# Patient Record
Sex: MALE | Employment: UNEMPLOYED | URBAN - METROPOLITAN AREA
[De-identification: names, ages, dates, MRNs, and addresses within clinical notes are randomized per-mention and may not be internally consistent; named-entity substitution may affect disease eponyms.]

---

## 2018-03-16 ENCOUNTER — OFFICE VISIT (OUTPATIENT)
Dept: FAMILY MEDICINE | Facility: CLINIC | Age: 43
End: 2018-03-16
Payer: COMMERCIAL

## 2018-03-16 VITALS
OXYGEN SATURATION: 98 % | SYSTOLIC BLOOD PRESSURE: 121 MMHG | HEART RATE: 90 BPM | DIASTOLIC BLOOD PRESSURE: 86 MMHG | TEMPERATURE: 97.9 F | WEIGHT: 186 LBS

## 2018-03-16 DIAGNOSIS — K59.01 SLOW TRANSIT CONSTIPATION: ICD-10-CM

## 2018-03-16 DIAGNOSIS — K21.9 GASTROESOPHAGEAL REFLUX DISEASE, ESOPHAGITIS PRESENCE NOT SPECIFIED: ICD-10-CM

## 2018-03-16 DIAGNOSIS — R10.32 LLQ ABDOMINAL PAIN: Primary | ICD-10-CM

## 2018-03-16 PROCEDURE — 99203 OFFICE O/P NEW LOW 30 MIN: CPT | Performed by: FAMILY MEDICINE

## 2018-03-16 NOTE — PROGRESS NOTES
SUBJECTIVE:   Trevor Wallace is a 42 year old male who presents to clinic today for the following health issues:      ABDOMINAL PAIN     Onset: 2 weeks     Description:   Character: Stabbing  Location: left lower quadrant  Radiation: None    Intensity: severe    Progression of Symptoms:  intermittent    Accompanying Signs & Symptoms:  Fever/Chills?: no   Gas/Bloating: YES  Nausea: YES  Vomitting: no   Diarrhea?: no   Constipation:YES  Dysuria or Hematuria: no    History:   Trauma: no   Previous similar pain: YES   Previous tests done: none    Precipitating factors:   Does the pain change with:     Food: no      BM: YES    Urination: YES feels like he can't emply his bladder    Alleviating factors:      Therapies Tried and outcome: zantac    LMP:  not applicable     He is a new patient to our clinic.  He has received care at [facility] in the past.  He has a known history of GERD.  He is on Zantac for that.  In recent weeks he has been having some left lower quadrant abdominal discomfort.  He feels bloated at times.  Zantac does not seem to help it.  He does acknowledge having issues with constipation.  He is not using anything specifically for that now.  He has used an orange flavored powder in the past which has been helpful.  He is not sure the name of it.  He does have a history of a sensation of incomplete bladder emptying.  He was advised to see urology for that previously, but has not done so.  He is on no routine medications other than the ranitidine.    Problem list and histories reviewed & adjusted, as indicated.  Additional history: as documented    There is no problem list on file for this patient.    History reviewed. No pertinent surgical history.    Social History   Substance Use Topics     Smoking status: Current Some Day Smoker     Smokeless tobacco: Never Used     Alcohol use No     Family History   Problem Relation Age of Onset     Hypertension Mother          Current Outpatient Prescriptions  "  Medication Sig Dispense Refill     ranitidine (ZANTAC) 150 MG tablet Take by mouth 2 times daily       Not on File    Reviewed and updated as needed this visit by clinical staff  Tobacco  Allergies  Meds  Med Hx  Surg Hx  Fam Hx  Soc Hx      Reviewed and updated as needed this visit by Provider         ROS:  CONSTITUTIONAL: NEGATIVE for fever, chills, change in weight  ENT/MOUTH: NEGATIVE for ear, mouth and throat problems  RESP: NEGATIVE for significant cough or SOB  CV: NEGATIVE for chest pain, palpitations or peripheral edema    OBJECTIVE:     /86 (BP Location: Right arm, Patient Position: Sitting, Cuff Size: Adult Regular)  Pulse 90  Temp 97.9  F (36.6  C) (Oral)  Wt 186 lb (84.4 kg)  SpO2 98%  There is no height or weight on file to calculate BMI.  GENERAL: healthy, alert and no distress  ABDOMEN: Soft with very mild midepigastric and left lower quadrant abdominal discomfort.  He has some mild generalized \"fullness\" to the abdomen, but no distinct mass palpable that I can appreciate.  No guarding or rebound tenderness.    Diagnostic Test Results:  none     ASSESSMENT/PLAN:       ICD-10-CM    1. LLQ abdominal pain R10.32 CT Abdomen Pelvis w Contrast   2. Gastroesophageal reflux disease, esophagitis presence not specified K21.9    3. Slow transit constipation K59.01      I suspect his symptoms are primarily related to constipation, but he does have concerned about his abdominal fullness and bloating  I think a CT scan would be reasonable to help rule out underlying mass, diverticulitis, etc.  I advised him to use the orange flavored product in the meantime, which is likely Metamucil or Citrucel, to help him deal with the constipation  Continue with the ranitidine as well  I will have him return a day or 2 after the CT scan is done to review results and determine a further game plan for him    Jairo Clay MD  [facility]    "

## 2018-03-16 NOTE — MR AVS SNAPSHOT
After Visit Summary   3/16/2018    Trevor Wallace    MRN: [id number]           Patient Information     Date Of Birth          1975        Visit Information        Provider Department      3/16/2018 2:00 PM Jairo Clay MD [facility]        Today's Diagnoses     LLQ abdominal pain    -  1    Gastroesophageal reflux disease, esophagitis presence not specified        Slow transit constipation           Follow-ups after your visit        Your next 10 appointments already scheduled     Mar 19, 2018  2:00 PM CDT   (Arrive by 1:45 PM)   CT ABDOMEN PELVIS W CONTRAST with BECT1   [facility]    [address] [place] [id number] [phone]           Please bring any scans or X-rays taken at other hospitals, if similar tests were done. Also bring a list of your medicines, including vitamins, minerals and over-the-counter drugs. It is safest to leave personal items at home.  Be sure to tell your doctor:   If you have any allergies.   If there s any chance you are pregnant.   If you are breastfeeding.  You may have contrast for this exam. To prepare:   Do not eat or drink for 2 hours before your exam. If you need to take medicine, you may take it with small sips of water. (We may ask you to take liquid medicine as well.)   The day before your exam, drink extra fluids at least six 8-ounce glasses (unless your doctor tells you to restrict your fluids).   You will be given instructions on how to drink the contrast.  Patients over 70 or patients with diabetes or kidney problems:   If you haven t had a blood test (creatinine test) within the last 30 days, the Cardiologist/Radiologist may require you to get this test prior to your exam.  If you have diabetes:   Continue to take your metformin medication on the day of your exam  Please wear loose clothing, such as a sweat suit or jogging clothes. Avoid snaps, zippers and other metal.  "We may ask you to undress and put on a hospital gown.  If you have any questions, please call the Imaging Department where you will have your exam.            Mar 21, 2018  3:00 PM CDT   Office Visit with Jairo Clay MD   [facility]    [address] [address]   [phone]           Bring a current list of meds and any records pertaining to this visit. For Physicals, please bring immunization records and any forms needing to be filled out. Please arrive 10 minutes early to complete paperwork.              Future tests that were ordered for you today     Open Future Orders        Priority Expected Expires Ordered    CT Abdomen Pelvis w Contrast Routine  3/16/2019 3/16/2018            Who to contact     If you have questions or need follow up information about today's clinic visit or your schedule please contact [facility] directly at [phone].  Normal or non-critical lab and imaging results will be communicated to you by MyChart, letter or phone within 4 business days after the clinic has received the results. If you do not hear from us within 7 days, please contact the clinic through [email or web address] or phone. If you have a critical or abnormal lab result, we will notify you by phone as soon as possible.  Submit refill requests through [employer] or call your pharmacy and they will forward the refill request to us. Please allow 3 business days for your refill to be completed.          Additional Information About Your Visit        [employer] Information     [employer] lets you send messages to your doctor, view your test results, renew your prescriptions, schedule appointments and more. To sign up, go to [email or web address] . Click on \"Log in\" on the left side of the screen, which will take you to the Welcome page. Then click on \"Sign up Now\" on the right side of the page.     You will be asked to " enter the access code listed below, as well as some personal information. Please follow the directions to create your username and password.     Your access code is: [email or web address]  Expires: 2018  2:42 PM     Your access code will  in 90 days. If you need help or a new code, please call your [place] clinic or [phone].        Care EveryWhere ID     This is your Care EveryWhere ID. This could be used by other organizations to access your [place] medical records  [id number]        Your Vitals Were     Pulse Temperature Pulse Oximetry             90 97.9  F (36.6  C) (Oral) 98%          Blood Pressure from Last 3 Encounters:   18 121/86    Weight from Last 3 Encounters:   18 186 lb (84.4 kg)               Primary Care Provider Office Phone # Fax #    Jairo Clay [phone]       [address] [facility] [address]        Equal Access to Services     BASSAM RODRIGUEZ AH: Hadii aad ku hadasho Soomaali, waaxda luqadaha, qaybta kaalmada adeegyada, waxay idiin hayaan adeeg kharash la'mariellen . So [facility] [phone].    ATENCIÓN: Si habla español, tiene a french disposición servicios gratuitos de asistencia lingüística. Llame al [phone].    We comply with applicable federal civil rights laws and [place] laws. We do not discriminate on the basis of race, color, national origin, age, disability, sex, sexual orientation, or gender identity.            Thank you!     Thank you for choosing [facility] your care. Our goal is always to provide you with excellent care. Hearing back from our patients is one way we can continue to improve our services. Please take a few minutes to complete the written survey that you may receive in the mail after your visit with us. Thank you!             Your Updated Medication List - Protect others around you: Learn how to safely use, store and throw away your medicines at www.disposemymeds.org.          This list is  accurate as of 3/16/18  2:42 PM.  Always use your most recent med list.                   Brand Name Dispense Instructions for use Diagnosis    ZANTAC 150 MG tablet   Generic drug:  ranitidine      Take by mouth 2 times daily

## 2018-03-16 NOTE — NURSING NOTE
Chief Complaint   Patient presents with     Gastrophageal Reflux     Health Maintenance     lipid     Constipation       Initial /86 (BP Location: Right arm, Patient Position: Sitting, Cuff Size: Adult Regular)  Pulse 90  Temp 97.9  F (36.6  C) (Oral)  Wt 186 lb (84.4 kg)  SpO2 98% There is no height or weight on file to calculate BMI.  Medication Reconciliation: complete   Geena Jean Baptiste ma

## 2018-03-19 ENCOUNTER — RADIANT APPOINTMENT (OUTPATIENT)
Dept: CT IMAGING | Facility: CLINIC | Age: 43
End: 2018-03-19
Attending: FAMILY MEDICINE
Payer: COMMERCIAL

## 2018-03-19 DIAGNOSIS — R10.32 LLQ ABDOMINAL PAIN: ICD-10-CM

## 2018-03-19 PROCEDURE — 74177 CT ABD & PELVIS W/CONTRAST: CPT | Mod: TC

## 2018-03-19 RX ORDER — IOPAMIDOL 755 MG/ML
91 INJECTION, SOLUTION INTRAVASCULAR ONCE
Status: COMPLETED | OUTPATIENT
Start: 2018-03-19 | End: 2018-03-19

## 2018-03-19 RX ADMIN — IOPAMIDOL 91 ML: 755 INJECTION, SOLUTION INTRAVASCULAR at 14:16

## 2018-03-21 ENCOUNTER — OFFICE VISIT (OUTPATIENT)
Dept: FAMILY MEDICINE | Facility: CLINIC | Age: 43
End: 2018-03-21
Payer: COMMERCIAL

## 2018-03-21 VITALS
SYSTOLIC BLOOD PRESSURE: 122 MMHG | TEMPERATURE: 98 F | WEIGHT: 188 LBS | HEIGHT: 66 IN | DIASTOLIC BLOOD PRESSURE: 85 MMHG | HEART RATE: 87 BPM | BODY MASS INDEX: 30.22 KG/M2 | OXYGEN SATURATION: 99 %

## 2018-03-21 DIAGNOSIS — E78.5 HYPERLIPIDEMIA LDL GOAL <130: ICD-10-CM

## 2018-03-21 DIAGNOSIS — R53.83 FATIGUE, UNSPECIFIED TYPE: ICD-10-CM

## 2018-03-21 DIAGNOSIS — R33.9 INCOMPLETE BLADDER EMPTYING: ICD-10-CM

## 2018-03-21 DIAGNOSIS — E66.811 OBESITY (BMI 30.0-34.9): ICD-10-CM

## 2018-03-21 DIAGNOSIS — K21.9 GASTROESOPHAGEAL REFLUX DISEASE, ESOPHAGITIS PRESENCE NOT SPECIFIED: Primary | ICD-10-CM

## 2018-03-21 DIAGNOSIS — R73.01 IMPAIRED FASTING GLUCOSE: ICD-10-CM

## 2018-03-21 PROCEDURE — 99214 OFFICE O/P EST MOD 30 MIN: CPT | Performed by: FAMILY MEDICINE

## 2018-03-21 NOTE — NURSING NOTE
"Chief Complaint   Patient presents with     RECHECK     Imaging       Initial /85 (BP Location: Right arm, Patient Position: Chair, Cuff Size: Adult Regular)  Pulse 87  Temp 98  F (36.7  C) (Oral)  Ht 5' 5.55\" (1.665 m)  Wt 188 lb (85.3 kg)  SpO2 99%  BMI 30.76 kg/m2 Estimated body mass index is 30.76 kg/(m^2) as calculated from the following:    Height as of this encounter: 5' 5.55\" (1.665 m).    Weight as of this encounter: 188 lb (85.3 kg).  Medication Reconciliation: complete   Areli Moss CMA       "

## 2018-03-21 NOTE — MR AVS SNAPSHOT
After Visit Summary   3/21/2018    Trevor Wallace    MRN: [id number]           Patient Information     Date Of Birth          1975        Visit Information        Provider Department      3/21/2018 3:00 PM Jairo Clay MD [facility]        Today's Diagnoses     Gastroesophageal reflux disease, esophagitis presence not specified    -  1    Incomplete bladder emptying        Hyperlipidemia LDL goal <130        Impaired fasting glucose        Fatigue, unspecified type           Follow-ups after your visit        Follow-up notes from your care team     Return in about 1 month (around 4/21/2018).      Future tests that were ordered for you today     Open Future Orders        Priority Expected Expires Ordered    Basic metabolic panel Routine 4/18/2018 5/31/2018 3/21/2018    Hemoglobin A1c Routine 4/18/2018 5/31/2018 3/21/2018    Lipid panel reflex to direct LDL Fasting Routine 4/18/2018 5/31/2018 3/21/2018    CBC with platelets Routine 4/18/2018 5/31/2018 3/21/2018    Prostate spec antigen screen Routine 4/18/2018 5/31/2018 3/21/2018    TSH with free T4 reflex Routine 4/18/2018 5/31/2018 3/21/2018            Who to contact     If you have questions or need follow up information about today's clinic visit or your schedule please contact [facility] directly at [phone].  Normal or non-critical lab and imaging results will be communicated to you by MyChart, letter or phone within 4 business days after the clinic has received the results. If you do not hear from us within 7 days, please contact the clinic through MyChart or phone. If you have a critical or abnormal lab result, we will notify you by phone as soon as possible.  Submit refill requests through [employer] or call your pharmacy and they will forward the refill request to us. Please allow 3 business days for your refill to be completed.          Additional Information About Your Visit       "  MyChart Information     [employer] lets you send messages to your doctor, view your test results, renew your prescriptions, schedule appointments and more. To sign up, go to [email or web address] . Click on \"Log in\" on the left side of the screen, which will take you to the Welcome page. Then click on \"Sign up Now\" on the right side of the page.     You will be asked to enter the access code listed below, as well as some personal information. Please follow the directions to create your username and password.     Your access code is: [email or web address]  Expires: 2018  2:42 PM     Your access code will  in 90 days. If you need help or a new code, please call your [place] clinic or [phone].        Care EveryWhere ID     This is your Care EveryWhere ID. This could be used by other organizations to access your [place] medical records  [id number]        Your Vitals Were     Pulse Temperature Height Pulse Oximetry BMI (Body Mass Index)       87 98  F (36.7  C) (Oral) 5' 5.55\" (1.665 m) 99% 30.76 kg/m2        Blood Pressure from Last 3 Encounters:   18 122/85   18 121/86    Weight from Last 3 Encounters:   18 188 lb (85.3 kg)   18 186 lb (84.4 kg)                 Today's Medication Changes          These changes are accurate as of 3/21/18  3:39 PM.  If you have any questions, ask your nurse or doctor.               Start taking these medicines.        Dose/Directions    omeprazole 20 MG CR capsule   Commonly known as:  priLOSEC   Used for:  Gastroesophageal reflux disease, esophagitis presence not specified   Started by:  Jairo Clay MD        Dose:  20 mg   Take 1 capsule (20 mg) by mouth daily   Quantity:  30 capsule   Refills:  1            Where to get your medicines      These medications were sent to [facility] - [place], [place] - [address]  [address], [facility] [address]     Phone:  [phone]     omeprazole 20 MG " CR capsule                Primary Care Provider Office Phone # Fax #    Jairo Clay [phone]       [address] [facility] [address]        Equal Access to Services     BASSAM RODRIGUEZ : Hadii cherie ku timoo Sobrittonali, waaxda luqadaha, qaybta kaalmada ademikayada, blanco haywood laLamarbrandyn tovar. So [facility] [phone].    ATENCIÓN: Si habla español, tiene a french disposición servicios gratuitos de asistencia lingüística. Llame al [phone].    We comply with applicable federal civil rights laws and [place] laws. We do not discriminate on the basis of race, color, national origin, age, disability, sex, sexual orientation, or gender identity.            Thank you!     Thank you for choosing [facility]  for your care. Our goal is always to provide you with excellent care. Hearing back from our patients is one way we can continue to improve our services. Please take a few minutes to complete the written survey that you may receive in the mail after your visit with us. Thank you!             Your Updated Medication List - Protect others around you: Learn how to safely use, store and throw away your medicines at www.disposemymeds.org.          This list is accurate as of 3/21/18  3:39 PM.  Always use your most recent med list.                   Brand Name Dispense Instructions for use Diagnosis    omeprazole 20 MG CR capsule    priLOSEC    30 capsule    Take 1 capsule (20 mg) by mouth daily    Gastroesophageal reflux disease, esophagitis presence not specified       ZANTAC 150 MG tablet   Generic drug:  ranitidine      Take by mouth 2 times daily

## 2018-03-21 NOTE — PROGRESS NOTES
SUBJECTIVE:   Trevor Wallace is a 42 year old male who presents to clinic today for the following health issues:      Patient is here to go over CT results.    I had seen him last week with some abdominal discomfort.  He was having some symptoms of GERD and also constipation.  He would also get abdominal bloating at times.  He felt like his abdomen is been bigger in the last few weeks.  We did a CT scan a couple of days ago.  He is here to review those results.  He has been using some Metamucil in the last week and that does seem to be helping his constipation.  It is helping to relieve some of his abdominal symptoms.  He still complains of some ongoing reflux symptoms, however.  He generally uses Zantac twice a day for that.  He does acknowledge gaining some weight in recent years.  He had a physical last year which showed elevated glucose and cholesterol readings.    Problem list and histories reviewed & adjusted, as indicated.  Additional history: as documented    Patient Active Problem List   Diagnosis     Gastroesophageal reflux disease, esophagitis presence not specified     Hyperlipidemia LDL goal <130     Impaired fasting glucose     History reviewed. No pertinent surgical history.    Social History   Substance Use Topics     Smoking status: Current Some Day Smoker     Types: Cigarettes     Smokeless tobacco: Never Used     Alcohol use No     Family History   Problem Relation Age of Onset     Hypertension Mother          Current Outpatient Prescriptions   Medication Sig Dispense Refill            ranitidine (ZANTAC) 150 MG tablet Take by mouth 2 times daily       No Known Allergies    Reviewed and updated as needed this visit by clinical staff  Tobacco  Allergies  Meds  Med Hx  Surg Hx  Fam Hx  Soc Hx      Reviewed and updated as needed this visit by Provider         ROS:  He has had some generalized fatigue at times.  He will sometimes feel especially tired when he is at his desk at work.  He has an  "IT job.    He does also complain of a sense of incomplete bladder emptying as well.      OBJECTIVE:     /85 (BP Location: Right arm, Patient Position: Chair, Cuff Size: Adult Regular)  Pulse 87  Temp 98  F (36.7  C) (Oral)  Ht 5' 5.55\" (1.665 m)  Wt 188 lb (85.3 kg)  SpO2 99%  BMI 30.76 kg/m2  Body mass index is 30.76 kg/(m^2).  GENERAL: alert, no distress and over weight  ABDOMEN: soft, nontender, no hepatosplenomegaly, no masses     Diagnostic Test Results:  Results for orders placed or performed in visit on 03/19/18   CT Abdomen Pelvis w Contrast    Narrative    CT ABDOMEN AND PELVIS WITH CONTRAST  3/19/2018 2:16 PM     HISTORY: Left lower quadrant pain. Rule out diverticulitis,  constipation, etc.    TECHNIQUE: Axial images from the lung bases to the symphysis are  performed with additional coronal reformatted images. 91 mL of Isovue  370 are given intravenously.  Radiation dose for this scan was reduced  using automated exposure control, adjustment of the mA and/or kV  according to patient size, or iterative reconstruction technique. Oral  contrast is also given.    FINDINGS:  The lung bases are clear.    Abdomen: The liver, spleen, gallbladder, pancreas, adrenal glands and  kidneys are unremarkable. No hydronephrosis. No enlarged abdominal  lymph nodes. The bowel is unremarkable. No obstruction or  diverticulitis.    Pelvis: The bladder, prostate and rectum are unremarkable. No enlarged  pelvic lymph nodes or free fluid. Bone window examination is  unremarkable. No aggressive appearing bone lesions are evident.      Impression    IMPRESSION: No acute changes in the abdomen or pelvis to account for  patient's symptoms. No bowel obstruction, diverticulitis or  appendicitis. No evidence of constipation.    ALAI CHANG MD         ASSESSMENT/PLAN:       ICD-10-CM    1. Gastroesophageal reflux disease, esophagitis presence not specified K21.9 omeprazole (PRILOSEC) 20 MG CR capsule   2. Incomplete bladder " emptying R33.9 Prostate spec antigen screen   3. Hyperlipidemia LDL goal <130 E78.5 Lipid panel reflex to direct LDL Fasting   4. Impaired fasting glucose R73.01 Basic metabolic panel     Hemoglobin A1c   5. Fatigue, unspecified type R53.83 CBC with platelets     TSH with free T4 reflex   6. Obesity (BMI 30.0-34.9) E66.9      His abdominal CT scan is normal and reassuring  He is having some ongoing symptoms of GERD  I suggested using omeprazole 20 mg daily for a month in place of the ranitidine  I encouraged him on diet and exercise treatment as well which should help his GERD as well as his lipids and glucose  Continue using a fiber supplement as needed for constipation  We will have him return in about 4 weeks for some fasting labs as ordered above followed by a general physical exam and recheck on his GERD and abdominal symptoms  Discussed possible upper endoscopy for his GERD and/or urology referral for his bladder symptoms    Jairo Clay MD  [facility]

## 2018-04-16 DIAGNOSIS — R53.83 FATIGUE, UNSPECIFIED TYPE: ICD-10-CM

## 2018-04-16 DIAGNOSIS — R33.9 INCOMPLETE BLADDER EMPTYING: ICD-10-CM

## 2018-04-16 DIAGNOSIS — R73.01 IMPAIRED FASTING GLUCOSE: ICD-10-CM

## 2018-04-16 DIAGNOSIS — E78.5 HYPERLIPIDEMIA LDL GOAL <130: ICD-10-CM

## 2018-04-16 LAB
ANION GAP SERPL CALCULATED.3IONS-SCNC: 6 MMOL/L (ref 3–14)
BUN SERPL-MCNC: 7 MG/DL (ref 7–30)
CALCIUM SERPL-MCNC: 8.8 MG/DL (ref 8.5–10.1)
CHLORIDE SERPL-SCNC: 107 MMOL/L (ref 94–109)
CHOLEST SERPL-MCNC: 190 MG/DL
CO2 SERPL-SCNC: 27 MMOL/L (ref 20–32)
CREAT SERPL-MCNC: 0.74 MG/DL (ref 0.66–1.25)
ERYTHROCYTE [DISTWIDTH] IN BLOOD BY AUTOMATED COUNT: 12.9 % (ref 10–15)
GFR SERPL CREATININE-BSD FRML MDRD: >90 ML/MIN/1.7M2
GLUCOSE SERPL-MCNC: 111 MG/DL (ref 70–99)
HBA1C MFR BLD: 6.1 % (ref 0–5.6)
HCT VFR BLD AUTO: 40.5 % (ref 40–53)
HDLC SERPL-MCNC: 45 MG/DL
HGB BLD-MCNC: 13.7 G/DL (ref 13.3–17.7)
LDLC SERPL CALC-MCNC: 99 MG/DL
MCH RBC QN AUTO: 29.8 PG (ref 26.5–33)
MCHC RBC AUTO-ENTMCNC: 33.8 G/DL (ref 31.5–36.5)
MCV RBC AUTO: 88 FL (ref 78–100)
NONHDLC SERPL-MCNC: 145 MG/DL
PLATELET # BLD AUTO: 348 10E9/L (ref 150–450)
POTASSIUM SERPL-SCNC: 4.1 MMOL/L (ref 3.4–5.3)
PSA SERPL-ACNC: 0.76 UG/L (ref 0–4)
RBC # BLD AUTO: 4.59 10E12/L (ref 4.4–5.9)
SODIUM SERPL-SCNC: 140 MMOL/L (ref 133–144)
TRIGL SERPL-MCNC: 232 MG/DL
TSH SERPL DL<=0.005 MIU/L-ACNC: 1.64 MU/L (ref 0.4–4)
WBC # BLD AUTO: 12.7 10E9/L (ref 4–11)

## 2018-04-16 PROCEDURE — 85027 COMPLETE CBC AUTOMATED: CPT | Performed by: FAMILY MEDICINE

## 2018-04-16 PROCEDURE — 80048 BASIC METABOLIC PNL TOTAL CA: CPT | Performed by: FAMILY MEDICINE

## 2018-04-16 PROCEDURE — 84443 ASSAY THYROID STIM HORMONE: CPT | Performed by: FAMILY MEDICINE

## 2018-04-16 PROCEDURE — 83036 HEMOGLOBIN GLYCOSYLATED A1C: CPT | Performed by: FAMILY MEDICINE

## 2018-04-16 PROCEDURE — 36415 COLL VENOUS BLD VENIPUNCTURE: CPT | Performed by: FAMILY MEDICINE

## 2018-04-16 PROCEDURE — 80061 LIPID PANEL: CPT | Performed by: FAMILY MEDICINE

## 2018-04-16 PROCEDURE — G0103 PSA SCREENING: HCPCS | Performed by: FAMILY MEDICINE

## 2018-04-20 ENCOUNTER — OFFICE VISIT (OUTPATIENT)
Dept: FAMILY MEDICINE | Facility: CLINIC | Age: 43
End: 2018-04-20
Payer: COMMERCIAL

## 2018-04-20 VITALS
WEIGHT: 191 LBS | HEART RATE: 81 BPM | OXYGEN SATURATION: 95 % | HEIGHT: 66 IN | DIASTOLIC BLOOD PRESSURE: 83 MMHG | BODY MASS INDEX: 30.7 KG/M2 | SYSTOLIC BLOOD PRESSURE: 123 MMHG | TEMPERATURE: 97.6 F

## 2018-04-20 DIAGNOSIS — F17.200 SMOKING: ICD-10-CM

## 2018-04-20 DIAGNOSIS — K21.9 GASTROESOPHAGEAL REFLUX DISEASE, ESOPHAGITIS PRESENCE NOT SPECIFIED: ICD-10-CM

## 2018-04-20 DIAGNOSIS — R06.83 SNORING: ICD-10-CM

## 2018-04-20 DIAGNOSIS — E78.5 HYPERLIPIDEMIA LDL GOAL <130: ICD-10-CM

## 2018-04-20 DIAGNOSIS — Z00.00 ROUTINE GENERAL MEDICAL EXAMINATION AT A HEALTH CARE FACILITY: Primary | ICD-10-CM

## 2018-04-20 DIAGNOSIS — E66.811 OBESITY (BMI 30.0-34.9): ICD-10-CM

## 2018-04-20 DIAGNOSIS — R73.01 IMPAIRED FASTING GLUCOSE: ICD-10-CM

## 2018-04-20 PROCEDURE — 99396 PREV VISIT EST AGE 40-64: CPT | Performed by: FAMILY MEDICINE

## 2018-04-20 PROCEDURE — 99213 OFFICE O/P EST LOW 20 MIN: CPT | Mod: 25 | Performed by: FAMILY MEDICINE

## 2018-04-20 NOTE — NURSING NOTE
"Chief Complaint   Patient presents with     Physical     Imaging results       Initial /83 (BP Location: Right arm, Patient Position: Chair, Cuff Size: Adult Regular)  Pulse 81  Temp 97.6  F (36.4  C) (Oral)  Ht 5' 5.55\" (1.665 m)  Wt 191 lb (86.6 kg)  SpO2 95%  BMI 31.25 kg/m2 Estimated body mass index is 31.25 kg/(m^2) as calculated from the following:    Height as of this encounter: 5' 5.55\" (1.665 m).    Weight as of this encounter: 191 lb (86.6 kg).  Medication Reconciliation: complete   Areli Moss CMA       "

## 2018-04-20 NOTE — MR AVS SNAPSHOT
After Visit Summary   4/20/2018    Trevor Wallace    MRN: [id number]           Patient Information     Date Of Birth          1975        Visit Information        Provider Department      4/20/2018 3:00 PM Jairo Clay MD [facility]        Today's Diagnoses     Routine general medical examination at a health care facility    -  1    Gastroesophageal reflux disease, esophagitis presence not specified        Hyperlipidemia LDL goal <130        Obesity (BMI 30.0-34.9)        Impaired fasting glucose        Snoring          Care Instructions      Preventive Health Recommendations  Male Ages 40 to 49    Yearly exam:             See your health care provider every year in order to  o   Review health changes.   o   Discuss preventive care.    o   Review your medicines if your doctor has prescribed any.    You should be tested each year for STDs (sexually transmitted diseases) if you re at risk.     Have a cholesterol test every 5 years.     Have a colonoscopy (test for colon cancer) if someone in your family has had colon cancer or polyps before age 50.     After age 45, have a diabetes test (fasting glucose). If you are at risk for diabetes, you should have this test every 3 years.      Talk with your health care provider about whether or not a prostate cancer screening test (PSA) is right for you.    Shots: Get a flu shot each year. Get a tetanus shot every 10 years.     Nutrition:    Eat at least 5 servings of fruits and vegetables daily.     Eat whole-grain bread, whole-wheat pasta and brown rice instead of white grains and rice.     Talk to your provider about Calcium and Vitamin D.     Lifestyle    Exercise for at least 150 minutes a week (30 minutes a day, 5 days a week). This will help you control your weight and prevent disease.     Limit alcohol to one drink per day.     No smoking.     Wear sunscreen to prevent skin cancer.     See your dentist every six months for an  "exam and cleaning.              Follow-ups after your visit        Who to contact     If you have questions or need follow up information about today's clinic visit or your schedule please contact [facility] directly at [phone].  Normal or non-critical lab and imaging results will be communicated to you by MyChart, letter or phone within 4 business days after the clinic has received the results. If you do not hear from us within 7 days, please contact the clinic through MyChart or phone. If you have a critical or abnormal lab result, we will notify you by phone as soon as possible.  Submit refill requests through [employer] or call your pharmacy and they will forward the refill request to us. Please allow 3 business days for your refill to be completed.          Additional Information About Your Visit        [employer] Information     [employer] lets you send messages to your doctor, view your test results, renew your prescriptions, schedule appointments and more. To sign up, go to [email or web address] . Click on \"Log in\" on the left side of the screen, which will take you to the Welcome page. Then click on \"Sign up Now\" on the right side of the page.     You will be asked to enter the access code listed below, as well as some personal information. Please follow the directions to create your username and password.     Your access code is: [email or web address]  Expires: 2018  2:42 PM     Your access code will  in 90 days. If you need help or a new code, please call your [facility] or [phone].        Care EveryWhere ID     This is your Care EveryWhere ID. This could be used by other organizations to access your [place] medical records  [id number]        Your Vitals Were     Pulse Temperature Height Pulse Oximetry BMI (Body Mass Index)       81 97.6  F (36.4  C) (Oral) 5' 5.55\" (1.665 m) 95% 31.25 kg/m2        Blood Pressure from Last 3 Encounters:   18 123/83   18 " 122/85   03/16/18 121/86    Weight from Last 3 Encounters:   04/20/18 191 lb (86.6 kg)   03/21/18 188 lb (85.3 kg)   03/16/18 186 lb (84.4 kg)              Today, you had the following     No orders found for display         Where to get your medicines      These medications were sent to [facility] - [place], [place] - [address]  [address], [facility] [address]     Phone:  [phone]     omeprazole 20 MG CR capsule          Primary Care Provider Office Phone # Fax #    Jairo Clay [phone]       [address]  [facility] [address]        Equal Access to Services     BASSAM RODRIGUEZ : Hadii cherie greenwoodo Solilia, waaxda luqadaha, qaybta kaalmada adeegyada, blanco quinones . So [facility] [phone].    ATENCIÓN: Si habla español, tiene a french disposición servicios gratuitos de asistencia lingüística. [facility] [phone].    We comply with applicable federal civil rights laws and [place] laws. We do not discriminate on the basis of race, color, national origin, age, disability, sex, sexual orientation, or gender identity.            Thank you!     Thank you for choosing [facility]  for your care. Our goal is always to provide you with excellent care. Hearing back from our patients is one way we can continue to improve our services. Please take a few minutes to complete the written survey that you may receive in the mail after your visit with us. Thank you!             Your Updated Medication List - Protect others around you: Learn how to safely use, store and throw away your medicines at www.disposemymeds.org.          This list is accurate as of 4/20/18  3:40 PM.  Always use your most recent med list.                   Brand Name Dispense Instructions for use Diagnosis    omeprazole 20 MG CR capsule    priLOSEC    30 capsule    Take 1 capsule (20 mg) by mouth daily     Gastroesophageal reflux disease, esophagitis presence not specified

## 2018-04-20 NOTE — PROGRESS NOTES
SUBJECTIVE:   CC: Trevor Wallace is an 42 year old male who presents for a preventative health visit and follow-up on some baseline health conditions.     Physical   Annual:     Getting at least 3 servings of Calcium per day::  NO    Bi-annual eye exam::  NO    Dental care twice a year::  Yes    Sleep apnea or symptoms of sleep apnea::  Daytime drowsiness, Excessive snoring and Sleep apnea    Diet::  Regular (no restrictions), Low salt and Breakfast skipped    Frequency of exercise::  1 day/week    Duration of exercise::  N/A    Taking medications regularly::  Yes    Medication side effects::  None    Additional concerns today::  YES            Other concerns:     Go over imaging and lab results.  He has been using the omeprazole over the last month and that has completely resolved his symptoms.  He no longer has any GI discomfort or reflux symptoms.  See previous note for details.    Today's PHQ-2 Score:   PHQ-2 ( 1999 [employer]izer) 4/20/2018   Q1: Little interest or pleasure in doing things 0   Q2: Feeling down, depressed or hopeless 0   PHQ-2 Score 0   Q1: Little interest or pleasure in doing things Not at all   Q2: Feeling down, depressed or hopeless Not at all   PHQ-2 Score 0       Abuse: Current or Past(Physical, Sexual or Emotional)- No  Do you feel safe in your environment - Yes    Social History   Substance Use Topics     Smoking status: Current Some Day Smoker     Types: Cigarettes     Smokeless tobacco: Never Used     Alcohol use No     Alcohol Use 4/20/2018   If you drink alcohol do you typically have greater than 3 drinks per day OR greater than 7 drinks per week? Not Applicable       Last PSA:   PSA   Date Value Ref Range Status   04/16/2018 0.76 0 - 4 ug/L Final     Comment:     Assay Method:  Chemiluminescence using Siemens Vista analyzer       Reviewed orders with patient. Reviewed health maintenance and updated orders accordingly - Yes  Patient Active Problem List   Diagnosis     Gastroesophageal reflux  "disease, esophagitis presence not specified     Hyperlipidemia LDL goal <130     Impaired fasting glucose     Obesity (BMI 30.0-34.9)     Snoring     History reviewed. No pertinent surgical history.    Social History   Substance Use Topics     Smoking status: Current Some Day Smoker     Types: Cigarettes     Smokeless tobacco: Never Used     Alcohol use No     Family History   Problem Relation Age of Onset     Hypertension Mother          Current Outpatient Prescriptions   Medication Sig Dispense Refill     omeprazole (PRILOSEC) 20 MG CR capsule Take 1 capsule (20 mg) by mouth daily 30 capsule 5     No Known Allergies    Reviewed and updated as needed this visit by clinical staff  Tobacco  Allergies  Meds  Med Hx  Surg Hx  Fam Hx  Soc Hx        Reviewed and updated as needed this visit by Provider            Review of Systems  C: NEGATIVE for fever, chills, change in weight; he snores loudly when he sleeps, but does not seem to have sleep apnea number  I: NEGATIVE for worrisome rashes, moles or lesions  E: NEGATIVE for vision changes or irritation  ENT: NEGATIVE for ear, mouth and throat problems  R: NEGATIVE for significant cough or SOB  CV: NEGATIVE for chest pain, palpitations or peripheral edema  GI: See above   male: negative for dysuria, hematuria, decreased urinary stream, erectile dysfunction, urethral discharge  M: NEGATIVE for significant arthralgias or myalgia  N: NEGATIVE for weakness, dizziness or paresthesias  P: NEGATIVE for changes in mood or affect    OBJECTIVE:   /83 (BP Location: Right arm, Patient Position: Chair, Cuff Size: Adult Regular)  Pulse 81  Temp 97.6  F (36.4  C) (Oral)  Ht 5' 5.55\" (1.665 m)  Wt 191 lb (86.6 kg)  SpO2 95%  BMI 31.25 kg/m2    Physical Exam  GENERAL: alert, no distress and over weight  EYES: Eyes grossly normal to inspection, PERRL and conjunctivae and sclerae normal  HENT: ear canals and TM's normal, nose and mouth without ulcers or lesions  NECK: no " adenopathy, no asymmetry, masses, or scars and thyroid normal to palpation  RESP: lungs clear to auscultation - no rales, rhonchi or wheezes  CV: regular rate and rhythm, normal S1 S2, no S3 or S4, no murmur, click or rub, no peripheral edema and peripheral pulses strong  ABDOMEN: soft, nontender, no hepatosplenomegaly, no masses   MS: no gross musculoskeletal defects noted, no edema  SKIN: no suspicious lesions or rashes  NEURO: Normal strength and tone, mentation intact and speech normal  PSYCH: mentation appears normal, affect normal/bright    Orders Only on 04/16/2018   Component Date Value Ref Range Status     Sodium 04/16/2018 140  133 - 144 mmol/L Final     Potassium 04/16/2018 4.1  3.4 - 5.3 mmol/L Final     Chloride 04/16/2018 107  94 - 109 mmol/L Final     Carbon Dioxide 04/16/2018 27  20 - 32 mmol/L Final     Anion Gap 04/16/2018 6  3 - 14 mmol/L Final     Glucose 04/16/2018 111* 70 - 99 mg/dL Final    Fasting specimen     Urea Nitrogen 04/16/2018 7  7 - 30 mg/dL Final     Creatinine 04/16/2018 0.74  0.66 - 1.25 mg/dL Final     GFR Estimate 04/16/2018 >90  >60 mL/min/1.7m2 Final    Non  GFR Calc     GFR Estimate If Black 04/16/2018 >90  >60 mL/min/1.7m2 Final    African American GFR Calc     Calcium 04/16/2018 8.8  8.5 - 10.1 mg/dL Final     Hemoglobin A1C 04/16/2018 6.1* 0 - 5.6 % Final    Comment: Normal <5.7% Prediabetes 5.7-6.4%  Diabetes 6.5% or higher - adopted from ADA   consensus guidelines.       Cholesterol 04/16/2018 190  <200 mg/dL Final     Triglycerides 04/16/2018 232* <150 mg/dL Final    Comment: Borderline high:  150-199 mg/dl  High:             200-499 mg/dl  Very high:       >499 mg/dl  Fasting specimen       HDL Cholesterol 04/16/2018 45  >39 mg/dL Final     LDL Cholesterol Calculated 04/16/2018 99  <100 mg/dL Final    Desirable:       <100 mg/dl     Non HDL Cholesterol 04/16/2018 145* <130 mg/dL Final    Comment: Above Desirable:  130-159 mg/dl  Borderline high:   160-189 mg/dl  High:             190-219 mg/dl  Very high:       >219 mg/dl       WBC 04/16/2018 12.7* 4.0 - 11.0 10e9/L Final     RBC Count 04/16/2018 4.59  4.4 - 5.9 10e12/L Final     Hemoglobin 04/16/2018 13.7  13.3 - 17.7 g/dL Final     Hematocrit 04/16/2018 40.5  40.0 - 53.0 % Final     MCV 04/16/2018 88  78 - 100 fl Final     MCH 04/16/2018 29.8  26.5 - 33.0 pg Final     MCHC 04/16/2018 33.8  31.5 - 36.5 g/dL Final     RDW 04/16/2018 12.9  10.0 - 15.0 % Final     Platelet Count 04/16/2018 348  150 - 450 10e9/L Final     PSA 04/16/2018 0.76  0 - 4 ug/L Final    Assay Method:  Chemiluminescence using Siemens Vista analyzer     TSH 04/16/2018 1.64  0.40 - 4.00 mU/L Final     The CT scan was previously reviewed and was basically unremarkable    ASSESSMENT/PLAN:       ICD-10-CM    1. Routine general medical examination at a health care facility Z00.00    2. Gastroesophageal reflux disease, esophagitis presence not specified K21.9 omeprazole (PRILOSEC) 20 MG CR capsule   3. Hyperlipidemia LDL goal <130 E78.5    4. Obesity (BMI 30.0-34.9) E66.9    5. Impaired fasting glucose R73.01    6. Snoring R06.83      His blood pressure and other vital signs look fine  His labs show impaired fasting glucose and hypertriglyceridemia  I discussed the benefits of diet and exercise treatment for weight loss to help his glucose, lipids, snoring, reflux, etc.   He can continue with the omeprazole as needed, but I suggested trying to  wean off that medication as able  I gave him a handout on antireflux measures to follow  Plan a tentative recheck in 1 year, although he could recheck sooner as needed/desired    COUNSELING:   Reviewed preventive health counseling, as reflected in patient instructions       Regular exercise       Healthy diet/nutrition         reports that he has been smoking Cigarettes.  He has never used smokeless tobacco.    Estimated body mass index is 31.25 kg/(m^2) as calculated from the following:    Height as of  "this encounter: 5' 5.55\" (1.665 m).    Weight as of this encounter: 191 lb (86.6 kg).   Weight management plan: Discussed healthy diet and exercise guidelines and patient will follow up in 12 months in clinic to re-evaluate.    Counseling Resources:  ATP IV Guidelines  Pooled Cohorts Equation Calculator  FRAX Risk Assessment  ICSI Preventive Guidelines  Dietary Guidelines for Americans, 2010  USDA's MyPlate  ASA Prophylaxis  Lung CA Screening    Jairo Clay MD  [facility]      Answers for HPI/ROS submitted by the patient on 4/20/2018   PHQ-2 Score: 0    "

## 2018-07-24 ENCOUNTER — TELEPHONE (OUTPATIENT)
Dept: FAMILY MEDICINE | Facility: CLINIC | Age: 43
End: 2018-07-24

## 2018-07-24 DIAGNOSIS — K21.9 GASTROESOPHAGEAL REFLUX DISEASE, ESOPHAGITIS PRESENCE NOT SPECIFIED: Primary | ICD-10-CM

## 2018-07-24 NOTE — TELEPHONE ENCOUNTER
Prior Authorization Retail Medication Request    Medication/Dose: Omeprazole 20  ICD code (if different than what is on RX):    Previously Tried and Failed:    Rationale:  Max 120 days in 365 days    (have to try and fail on Ranitidine)    Insurance Name:  [employer] [employer]  Insurance ID:  [id number]      Pharmacy Information (if different than what is on RX)  Name:  Keesha Levy CPhT  [facility]  [phone]

## 2018-07-25 NOTE — TELEPHONE ENCOUNTER
PA Initiation    Medication: Omeprazole 20 mg  Insurance Company: [employer] - Phone [phone] Fax [phone]  Pharmacy Filling the Rx: [facility] [place], [place] - [address] [place] [address]  Filling Pharmacy Phone: [phone]  Filling Pharmacy Fax:    Start Date: 7/25/2018    THIS HAS BEEN SUBMITTED BY THE PRIOR-AUTHORIZATION TEAM. ANY QUESTIONS PLEASE CALL [phone]. THANK YOU

## 2018-07-26 NOTE — TELEPHONE ENCOUNTER
PRIOR AUTHORIZATION DENIED    Medication: Omeprazole 20 mg DENIED     Denial Date: 7/26/2018    Denial Rational: patient is allowed 1 capsule a day for maximum of 120 days within 365 days         Appeal Information: IF YOU WOULD LIKE TO APPEAL, PLEASE SUPPLY PA TEAM WITH A LETTER OF MEDICAL NECESSITY.

## 2018-07-26 NOTE — TELEPHONE ENCOUNTER
Patient stopped by the pharmacy and was told the Omeprazole was denied. He is requesting an alternative. Pharmacy said possibly Zantac.  Areli Moss CMA

## 2021-02-18 ENCOUNTER — OFFICE VISIT (OUTPATIENT)
Dept: FAMILY MEDICINE | Facility: CLINIC | Age: 46
End: 2021-02-18
Payer: COMMERCIAL

## 2021-02-18 VITALS
HEART RATE: 94 BPM | DIASTOLIC BLOOD PRESSURE: 84 MMHG | TEMPERATURE: 98.7 F | OXYGEN SATURATION: 100 % | WEIGHT: 207 LBS | SYSTOLIC BLOOD PRESSURE: 134 MMHG | BODY MASS INDEX: 33.87 KG/M2

## 2021-02-18 DIAGNOSIS — F17.200 SMOKING: ICD-10-CM

## 2021-02-18 DIAGNOSIS — R73.03 PREDIABETES: ICD-10-CM

## 2021-02-18 DIAGNOSIS — K21.9 GASTROESOPHAGEAL REFLUX DISEASE, UNSPECIFIED WHETHER ESOPHAGITIS PRESENT: Primary | ICD-10-CM

## 2021-02-18 DIAGNOSIS — M25.512 ACUTE PAIN OF LEFT SHOULDER: ICD-10-CM

## 2021-02-18 LAB
ANION GAP SERPL CALCULATED.3IONS-SCNC: 6 MMOL/L (ref 3–14)
BUN SERPL-MCNC: 12 MG/DL (ref 7–30)
CALCIUM SERPL-MCNC: 8.9 MG/DL (ref 8.5–10.1)
CHLORIDE SERPL-SCNC: 106 MMOL/L (ref 94–109)
CO2 SERPL-SCNC: 24 MMOL/L (ref 20–32)
CREAT SERPL-MCNC: 0.8 MG/DL (ref 0.66–1.25)
ERYTHROCYTE [DISTWIDTH] IN BLOOD BY AUTOMATED COUNT: 13.3 % (ref 10–15)
GFR SERPL CREATININE-BSD FRML MDRD: >90 ML/MIN/{1.73_M2}
GLUCOSE SERPL-MCNC: 143 MG/DL (ref 70–99)
HBA1C MFR BLD: 6.7 % (ref 0–5.6)
HCT VFR BLD AUTO: 39.9 % (ref 40–53)
HGB BLD-MCNC: 13.3 G/DL (ref 13.3–17.7)
MCH RBC QN AUTO: 28.4 PG (ref 26.5–33)
MCHC RBC AUTO-ENTMCNC: 33.3 G/DL (ref 31.5–36.5)
MCV RBC AUTO: 85 FL (ref 78–100)
PLATELET # BLD AUTO: 289 10E9/L (ref 150–450)
POTASSIUM SERPL-SCNC: 4.1 MMOL/L (ref 3.4–5.3)
RBC # BLD AUTO: 4.69 10E12/L (ref 4.4–5.9)
SODIUM SERPL-SCNC: 136 MMOL/L (ref 133–144)
WBC # BLD AUTO: 11.9 10E9/L (ref 4–11)

## 2021-02-18 PROCEDURE — 36415 COLL VENOUS BLD VENIPUNCTURE: CPT | Performed by: PHYSICIAN ASSISTANT

## 2021-02-18 PROCEDURE — 80048 BASIC METABOLIC PNL TOTAL CA: CPT | Performed by: PHYSICIAN ASSISTANT

## 2021-02-18 PROCEDURE — 99214 OFFICE O/P EST MOD 30 MIN: CPT | Performed by: PHYSICIAN ASSISTANT

## 2021-02-18 PROCEDURE — 83036 HEMOGLOBIN GLYCOSYLATED A1C: CPT | Performed by: PHYSICIAN ASSISTANT

## 2021-02-18 PROCEDURE — 85027 COMPLETE CBC AUTOMATED: CPT | Performed by: PHYSICIAN ASSISTANT

## 2021-02-18 ASSESSMENT — PAIN SCALES - GENERAL: PAINLEVEL: MILD PAIN (3)

## 2021-02-18 NOTE — PROGRESS NOTES
Assessment & Plan     Gastroesophageal reflux disease, unspecified whether esophagitis present  Chronic, recurrent GERD. Symptoms waking him at night at times. Not responding to nexium. Recommend getting upper endoscopy for further evaluation.   - GASTROENTEROLOGY ADULT REF PROCEDURE ONLY; Future    Acute pain of left shoulder  Acute pain - pain with certain arm movements. Suggest PT and ibuprofen.   - [facility] PT, HAND, AND CHIROPRACTIC REFERRAL; Future    Smoking  Wanting to quit. Has looked into chantix and is interested in trying it. Recommend follow up in 4 weeks to see how he is doing on the medicaiton.   - varenicline (CHANTIX MUNIRA) 0.5 MG X 11 & 1 MG X 42 tablet; Take 0.5 mg tab daily for 3 days, THEN 0.5 mg tab twice daily for 4 days, THEN 1 mg twice daily.    Prediabetes  Has had elevated blood sugars in the past. Recommend repeat blood work.   - Hemoglobin A1c  - Basic metabolic panel  (Ca, Cl, CO2, Creat, Gluc, K, Na, BUN)  - CBC with platelets    Return in about 4 weeks (around 3/18/2021).    Letty White PA-C  [facility] is a 45 year old who presents for the following health issues     HPI       Musculoskeletal problem/pain  Onset/Duration: 2 weeks  Description  Location: shoulder - left, chest  Joint Swelling: no  Redness: no  Pain: YES  Warmth: no  Intensity:  3/10  Progression of Symptoms:  Varies  Accompanying signs and symptoms:   Fevers: no  Numbness/tingling/weakness: YES- Weakness  History  Trauma to the area: no  Recent illness:  no  Previous similar problem: no  Previous evaluation:  no  Precipitating or alleviating factors:  Aggravating factors include: Certain movements  Therapies tried and outcome: nothing and Ibuprofen    No pain with lifting, no pain with reaching. Has pain into the back of his arm with driving.     Acid reflux symptoms are worsening.     Wants to quit smoking. 10-15 cigarettes.         Review of Systems   Constitutional,  HEENT, cardiovascular, pulmonary, gi and gu systems are negative, except as otherwise noted.      Objective    /84 (BP Location: Right arm, Patient Position: Chair, Cuff Size: Adult Regular)   Pulse 94   Temp 98.7  F (37.1  C) (Oral)   Wt 93.9 kg (207 lb)   SpO2 100%   BMI 33.87 kg/m    Body mass index is 33.87 kg/m .  Physical Exam   GENERAL: healthy, alert and no distress  NECK: no adenopathy, no asymmetry, masses, or scars and thyroid normal to palpation  RESP: lungs clear to auscultation - no rales, rhonchi or wheezes  CV: regular rate and rhythm, normal S1 S2, no S3 or S4, no murmur, click or rub, no peripheral edema and peripheral pulses strong  ABDOMEN: soft, + epigastric tenderness, no hepatosplenomegaly, no masses and bowel sounds normal  MS: no gross musculoskeletal defects noted, no edema  Left shoulder: tenderness to distal triceps tendon, trapezius muscle spasm. Triceps weakness.     Results for orders placed or performed in visit on 02/18/21 (from the past 24 hour(s))   Hemoglobin A1c   Result Value Ref Range    Hemoglobin A1C 6.7 (H) 0 - 5.6 %   CBC with platelets   Result Value Ref Range    WBC 11.9 (H) 4.0 - 11.0 10e9/L    RBC Count 4.69 4.4 - 5.9 10e12/L    Hemoglobin 13.3 13.3 - 17.7 g/dL    Hematocrit 39.9 (L) 40.0 - 53.0 %    MCV 85 78 - 100 fl    MCH 28.4 26.5 - 33.0 pg    MCHC 33.3 31.5 - 36.5 g/dL    RDW 13.3 10.0 - 15.0 %    Platelet Count 289 150 - 450 10e9/L

## 2021-02-19 ENCOUNTER — TELEPHONE (OUTPATIENT)
Dept: FAMILY MEDICINE | Facility: CLINIC | Age: 46
End: 2021-02-19

## 2021-02-19 NOTE — TELEPHONE ENCOUNTER
Please call patient.    Labs indicate he is now diabetic. He should try to decrease sugar intake and increase exercise.   He should plan on seeing me back in clinic in the next 1-2 weeks to discuss diabetes.    Letty White PA-C

## 2021-02-24 NOTE — TELEPHONE ENCOUNTER
Left message on answering machine for patient to call back to the nurse at [phone].    Bettie Zhou RN  [facility]

## 2021-02-24 NOTE — TELEPHONE ENCOUNTER
Patient Mycharted, requesting message to be sent that way. Encounter closed. See [employer] message 2/24/21.    Perlita Sweeney RN

## 2021-02-26 DIAGNOSIS — Z11.59 ENCOUNTER FOR SCREENING FOR OTHER VIRAL DISEASES: ICD-10-CM

## 2021-03-07 ENCOUNTER — HEALTH MAINTENANCE LETTER (OUTPATIENT)
Age: 46
End: 2021-03-07

## 2021-03-12 DIAGNOSIS — Z11.59 ENCOUNTER FOR SCREENING FOR OTHER VIRAL DISEASES: ICD-10-CM

## 2021-03-12 LAB
SARS-COV-2 RNA RESP QL NAA+PROBE: NORMAL
SPECIMEN SOURCE: NORMAL

## 2021-03-12 PROCEDURE — U0005 INFEC AGEN DETEC AMPLI PROBE: HCPCS | Performed by: SURGERY

## 2021-03-12 PROCEDURE — U0003 INFECTIOUS AGENT DETECTION BY NUCLEIC ACID (DNA OR RNA); SEVERE ACUTE RESPIRATORY SYNDROME CORONAVIRUS 2 (SARS-COV-2) (CORONAVIRUS DISEASE [COVID-19]), AMPLIFIED PROBE TECHNIQUE, MAKING USE OF HIGH THROUGHPUT TECHNOLOGIES AS DESCRIBED BY CMS-2020-01-R: HCPCS | Performed by: SURGERY

## 2021-03-13 LAB
LABORATORY COMMENT REPORT: NORMAL
SARS-COV-2 RNA RESP QL NAA+PROBE: NEGATIVE
SPECIMEN SOURCE: NORMAL

## 2021-03-15 ENCOUNTER — HOSPITAL ENCOUNTER (OUTPATIENT)
Facility: AMBULATORY SURGERY CENTER | Age: 46
Discharge: HOME | End: 2021-03-15
Attending: SURGERY | Admitting: SURGERY
Payer: COMMERCIAL

## 2021-03-15 ENCOUNTER — MEDICAL CORRESPONDENCE (OUTPATIENT)
Dept: HEALTH INFORMATION MANAGEMENT | Facility: CLINIC | Age: 46
End: 2021-03-15

## 2021-03-15 VITALS
HEART RATE: 115 BPM | OXYGEN SATURATION: 97 % | RESPIRATION RATE: 16 BRPM | TEMPERATURE: 97.9 F | DIASTOLIC BLOOD PRESSURE: 84 MMHG | SYSTOLIC BLOOD PRESSURE: 106 MMHG

## 2021-03-15 DIAGNOSIS — K21.00 GASTROESOPHAGEAL REFLUX DISEASE WITH ESOPHAGITIS WITHOUT HEMORRHAGE: Primary | ICD-10-CM

## 2021-03-15 LAB — UPPER GI ENDOSCOPY: NORMAL

## 2021-03-15 PROCEDURE — 43239 EGD BIOPSY SINGLE/MULTIPLE: CPT | Performed by: SURGERY

## 2021-03-15 PROCEDURE — 43239 EGD BIOPSY SINGLE/MULTIPLE: CPT

## 2021-03-15 PROCEDURE — 88305 TISSUE EXAM BY PATHOLOGIST: CPT | Performed by: PATHOLOGY

## 2021-03-15 PROCEDURE — 88342 IMHCHEM/IMCYTCHM 1ST ANTB: CPT | Performed by: PATHOLOGY

## 2021-03-15 PROCEDURE — G8918 PT W/O PREOP ORDER IV AB PRO: HCPCS

## 2021-03-15 PROCEDURE — G8907 PT DOC NO EVENTS ON DISCHARG: HCPCS

## 2021-03-15 RX ORDER — FLUMAZENIL 0.1 MG/ML
0.2 INJECTION, SOLUTION INTRAVENOUS
Status: DISCONTINUED | OUTPATIENT
Start: 2021-03-15 | End: 2021-03-16 | Stop reason: HOSPADM

## 2021-03-15 RX ORDER — NALOXONE HYDROCHLORIDE 0.4 MG/ML
0.2 INJECTION, SOLUTION INTRAMUSCULAR; INTRAVENOUS; SUBCUTANEOUS
Status: DISCONTINUED | OUTPATIENT
Start: 2021-03-15 | End: 2021-03-16 | Stop reason: HOSPADM

## 2021-03-15 RX ORDER — SUCRALFATE 1 G/1
1 TABLET ORAL 4 TIMES DAILY
Qty: 120 TABLET | Refills: 3 | Status: SHIPPED | OUTPATIENT
Start: 2021-03-15 | End: 2022-06-16

## 2021-03-15 RX ORDER — LIDOCAINE 40 MG/G
CREAM TOPICAL
Status: DISCONTINUED | OUTPATIENT
Start: 2021-03-15 | End: 2021-03-16 | Stop reason: HOSPADM

## 2021-03-15 RX ORDER — NALOXONE HYDROCHLORIDE 0.4 MG/ML
0.4 INJECTION, SOLUTION INTRAMUSCULAR; INTRAVENOUS; SUBCUTANEOUS
Status: DISCONTINUED | OUTPATIENT
Start: 2021-03-15 | End: 2021-03-16 | Stop reason: HOSPADM

## 2021-03-15 RX ORDER — FENTANYL CITRATE 50 UG/ML
INJECTION, SOLUTION INTRAMUSCULAR; INTRAVENOUS PRN
Status: DISCONTINUED | OUTPATIENT
Start: 2021-03-15 | End: 2021-03-15 | Stop reason: HOSPADM

## 2021-03-15 RX ORDER — ONDANSETRON 2 MG/ML
4 INJECTION INTRAMUSCULAR; INTRAVENOUS EVERY 6 HOURS PRN
Status: DISCONTINUED | OUTPATIENT
Start: 2021-03-15 | End: 2021-03-16 | Stop reason: HOSPADM

## 2021-03-15 RX ORDER — ONDANSETRON 4 MG/1
4 TABLET, ORALLY DISINTEGRATING ORAL EVERY 6 HOURS PRN
Status: DISCONTINUED | OUTPATIENT
Start: 2021-03-15 | End: 2021-03-16 | Stop reason: HOSPADM

## 2021-03-15 RX ORDER — PROCHLORPERAZINE MALEATE 10 MG
10 TABLET ORAL EVERY 6 HOURS PRN
Status: DISCONTINUED | OUTPATIENT
Start: 2021-03-15 | End: 2021-03-16 | Stop reason: HOSPADM

## 2021-03-15 NOTE — DISCHARGE INSTRUCTIONS
For the Carafate take 1 pill and drop in glass of water and allow to dissolve for 30 minutes and then drink it. Do this every 6 hours.  Do for 1 month and if start having symptoms again on the other medications for reducing acid then can start it again.  If still having problems follow up with me or your regular doctor.  This one may affect absorption of other medications.  Talk to your pharmacy about this.  I can order the liquid form but most insurance does not cover it and this is a lot less expensive.  I sent this to your pharmacy.     Then please follow up with my surgical partner Dr. Rufino Crawford to see what surgical options He has for you.   call  to get an appointment.

## 2021-03-18 LAB — COPATH REPORT: NORMAL

## 2021-03-23 ENCOUNTER — TELEPHONE (OUTPATIENT)
Dept: FAMILY MEDICINE | Facility: CLINIC | Age: 46
End: 2021-03-23

## 2021-03-23 NOTE — TELEPHONE ENCOUNTER
Called and left patient message to schedule an appointment.  If patient calls back, please help him schedule a visit.  Wanda Miller MA

## 2021-04-19 ENCOUNTER — OFFICE VISIT (OUTPATIENT)
Dept: FAMILY MEDICINE | Facility: CLINIC | Age: 46
End: 2021-04-19
Payer: COMMERCIAL

## 2021-04-19 VITALS
OXYGEN SATURATION: 100 % | BODY MASS INDEX: 32.43 KG/M2 | HEART RATE: 84 BPM | DIASTOLIC BLOOD PRESSURE: 89 MMHG | HEIGHT: 66 IN | WEIGHT: 201.8 LBS | TEMPERATURE: 98.5 F | SYSTOLIC BLOOD PRESSURE: 128 MMHG

## 2021-04-19 DIAGNOSIS — Z00.00 ROUTINE GENERAL MEDICAL EXAMINATION AT A HEALTH CARE FACILITY: Primary | ICD-10-CM

## 2021-04-19 DIAGNOSIS — E66.811 OBESITY (BMI 30.0-34.9): ICD-10-CM

## 2021-04-19 DIAGNOSIS — F17.200 SMOKING: ICD-10-CM

## 2021-04-19 DIAGNOSIS — L91.8 CUTANEOUS SKIN TAGS: ICD-10-CM

## 2021-04-19 DIAGNOSIS — K21.00 GASTROESOPHAGEAL REFLUX DISEASE WITH ESOPHAGITIS WITHOUT HEMORRHAGE: ICD-10-CM

## 2021-04-19 DIAGNOSIS — G47.19 EXCESSIVE DAYTIME SLEEPINESS: ICD-10-CM

## 2021-04-19 DIAGNOSIS — K59.00 CONSTIPATION, UNSPECIFIED CONSTIPATION TYPE: ICD-10-CM

## 2021-04-19 DIAGNOSIS — R06.83 SNORING: ICD-10-CM

## 2021-04-19 DIAGNOSIS — E78.5 HYPERLIPIDEMIA LDL GOAL <100: ICD-10-CM

## 2021-04-19 DIAGNOSIS — E11.9 TYPE 2 DIABETES MELLITUS WITHOUT COMPLICATION, WITHOUT LONG-TERM CURRENT USE OF INSULIN (H): ICD-10-CM

## 2021-04-19 PROBLEM — R73.01 IMPAIRED FASTING GLUCOSE: Status: RESOLVED | Noted: 2018-03-21 | Resolved: 2021-04-19

## 2021-04-19 PROCEDURE — 99213 OFFICE O/P EST LOW 20 MIN: CPT | Mod: 25 | Performed by: FAMILY MEDICINE

## 2021-04-19 PROCEDURE — 99396 PREV VISIT EST AGE 40-64: CPT | Performed by: FAMILY MEDICINE

## 2021-04-19 RX ORDER — VARENICLINE TARTRATE 1 MG/1
1 TABLET, FILM COATED ORAL 2 TIMES DAILY
Qty: 60 TABLET | Refills: 1 | Status: SHIPPED | OUTPATIENT
Start: 2021-04-19 | End: 2021-10-22

## 2021-04-19 ASSESSMENT — ENCOUNTER SYMPTOMS
PARESTHESIAS: 0
DYSURIA: 0
NAUSEA: 0
EYE PAIN: 0
HEMATURIA: 0
FEVER: 0
CHILLS: 0
HEARTBURN: 1
CONSTIPATION: 1
SORE THROAT: 0
NERVOUS/ANXIOUS: 0
PALPITATIONS: 1
DIZZINESS: 1
ABDOMINAL PAIN: 0
ARTHRALGIAS: 1
JOINT SWELLING: 0
DIARRHEA: 0
MYALGIAS: 1
WEAKNESS: 1
SHORTNESS OF BREATH: 1
HEADACHES: 1
COUGH: 0
FREQUENCY: 1
HEMATOCHEZIA: 0

## 2021-04-19 ASSESSMENT — PATIENT HEALTH QUESTIONNAIRE - PHQ9
SUM OF ALL RESPONSES TO PHQ QUESTIONS 1-9: 18
10. IF YOU CHECKED OFF ANY PROBLEMS, HOW DIFFICULT HAVE THESE PROBLEMS MADE IT FOR YOU TO DO YOUR WORK, TAKE CARE OF THINGS AT HOME, OR GET ALONG WITH OTHER PEOPLE: NOT DIFFICULT AT ALL
SUM OF ALL RESPONSES TO PHQ QUESTIONS 1-9: 18

## 2021-04-19 ASSESSMENT — MIFFLIN-ST. JEOR: SCORE: 1741.62

## 2021-04-19 NOTE — PROGRESS NOTES
SUBJECTIVE:   CC: Trevor Wallace is an 45 year old male who presents for preventative health visit and follow-up on some baseline health conditions.       Patient has been advised of split billing requirements and indicates understanding: Yes  Healthy Habits:     Getting at least 3 servings of Calcium per day:  NO    Bi-annual eye exam:  Yes    Dental care twice a year:  NO    Sleep apnea or symptoms of sleep apnea:  Daytime drowsiness, Excessive snoring and Sleep apnea    Diet:  Breakfast skipped    Frequency of exercise:  None    Taking medications regularly:  Yes    Medication side effects:  Other    PHQ-2 Total Score: 6    Additional concerns today:  Yes      Discuss diabetes  Drowsiness  Urination frequency  Constipation  Skin tags under his arm and thighs    He saw one of my colleagues a couple of months ago and had an A1c test that came back elevated at 6.7, so he was diagnosed with diabetes.  He has had no specific follow-up or formal care for that yet.  He does not feel like he sleeps very well.  He generally works nights and feels excessively tired when he is not sleeping.  He falls asleep at inappropriate times.  He is a loud snorer.  He has been urinating frequently for quite some time.  He has been having some problems with constipation.  He has a number of skin tags and had questions about them.    He is a smoker, but he is trying to quit.  He was taking Chantix for a while, but then stopped it.  He is open to going back on that.  He is on Nexium and Carafate for his GERD.  He had a recent upper endoscopy for that.    Today's PHQ-2 Score:   PHQ-2 ( 1999 [employer]) 4/19/2021   Q1: Little interest or pleasure in doing things 3   Q2: Feeling down, depressed or hopeless 3   PHQ-2 Score 6   Q1: Little interest or pleasure in doing things Nearly every day   Q2: Feeling down, depressed or hopeless Nearly every day   PHQ-2 Score 6       Abuse: Current or Past(Physical, Sexual or Emotional)- No  Do you feel safe in  your environment? Yes    Have you ever done Advance Care Planning? (For example, a Health Directive, POLST, or a discussion with a medical provider or your loved ones about your wishes): No, advance care planning information given to patient to review.  Advanced care planning was discussed at today's visit.    Social History     Tobacco Use     Smoking status: Current Some Day Smoker     Types: Cigarettes     Smokeless tobacco: Never Used   Substance Use Topics     Alcohol use: No         Alcohol Use 4/19/2021   Prescreen: >3 drinks/day or >7 drinks/week? Not Applicable       Last PSA:   PSA   Date Value Ref Range Status   04/16/2018 0.76 0 - 4 ug/L Final     Comment:     Assay Method:  Chemiluminescence using Siemens Vista analyzer       Reviewed orders with patient. Reviewed health maintenance and updated orders accordingly - Yes  Patient Active Problem List   Diagnosis     Gastroesophageal reflux disease, esophagitis presence not specified     Hyperlipidemia LDL goal <100     Obesity (BMI 30.0-34.9)     Snoring     Smoking     Type 2 diabetes mellitus without complication, without long-term current use of insulin (H)     Past Surgical History:   Procedure Laterality Date     COMBINED ESOPHAGOSCOPY, GASTROSCOPY, DUODENOSCOPY (EGD) WITH CO2 INSUFFLATION N/A 3/15/2021    Procedure: ESOPHAGOGASTRODUODENOSCOPY, WITH CO2 INSUFFLATION;  Surgeon: Mathew Laguna MD;  Location:  OR     ESOPHAGOSCOPY, GASTROSCOPY, DUODENOSCOPY (EGD), COMBINED N/A 3/15/2021    Procedure: Esophagogastroduodenoscopy, With Biopsy;  Surgeon: Mathew Laguna MD;  Location:  OR       Social History     Tobacco Use     Smoking status: Current Some Day Smoker     Types: Cigarettes     Smokeless tobacco: Never Used   Substance Use Topics     Alcohol use: No     Family History   Problem Relation Age of Onset     Hypertension Mother          Current Outpatient Medications   Medication Sig Dispense Refill     Esomeprazole Magnesium  "(NEXIUM PO)        sucralfate (CARAFATE) 1 GM tablet Take 1 tablet (1 g) by mouth 4 times daily 120 tablet 3     varenicline (CHANTIX MUNIRA) 0.5 MG X 11 & 1 MG X 42 tablet Take 0.5 mg tab daily for 3 days, THEN 0.5 mg tab twice daily for 4 days, THEN 1 mg twice daily. 53 tablet 0     No Known Allergies    Reviewed and updated as needed this visit by clinical staff  Tobacco  Allergies  Meds   Med Hx  Surg Hx  Fam Hx  Soc Hx        Reviewed and updated as needed this visit by Provider                    Review of Systems   Constitutional: Negative for chills and fever.   HENT: Positive for hearing loss. Negative for congestion, ear pain and sore throat.    Eyes: Positive for visual disturbance. Negative for pain.   Respiratory: Positive for shortness of breath. Negative for cough.    Cardiovascular: Positive for palpitations. Negative for chest pain and peripheral edema.   Gastrointestinal: Positive for constipation and heartburn. Negative for abdominal pain, diarrhea, hematochezia and nausea.   Genitourinary: Positive for discharge, frequency, impotence and urgency. Negative for dysuria, genital sores and hematuria.   Musculoskeletal: Positive for arthralgias and myalgias. Negative for joint swelling.   Skin: Negative for rash.   Neurological: Positive for dizziness, weakness and headaches. Negative for paresthesias.   Psychiatric/Behavioral: Positive for mood changes. The patient is not nervous/anxious.          OBJECTIVE:   /89 (BP Location: Left arm, Patient Position: Sitting, Cuff Size: Adult Large)   Pulse 84   Temp 98.5  F (36.9  C) (Oral)   Ht 1.666 m (5' 5.59\")   Wt 91.5 kg (201 lb 12.8 oz)   SpO2 100%   BMI 32.98 kg/m      Physical Exam  GENERAL: alert, no distress and obese  EYES: Eyes grossly normal to inspection, PERRL and conjunctivae and sclerae normal  HENT: Grossly normal  NECK: no adenopathy, no asymmetry, masses, or scars and thyroid normal to palpation  RESP: lungs clear to " auscultation - no rales, rhonchi or wheezes  CV: regular rate and rhythm, normal S1 S2, no S3 or S4, no murmur, click or rub, no peripheral edema   ABDOMEN: soft, nontender, no hepatosplenomegaly, no masses   MS: no gross musculoskeletal defects noted, no edema  SKIN: no suspicious lesions or rashes.  He has numerous skin tags in the axilla and scattered elsewhere.  NEURO: Normal strength and tone, sensation in feet is grossly intact, mentation intact and speech normal  PSYCH: mentation appears normal, affect normal/bright    Diagnostic Test Results:  Labs reviewed in Epic    ASSESSMENT/PLAN:       ICD-10-CM    1. Routine general medical examination at a health care facility  Z00.00    2. Type 2 diabetes mellitus without complication, without long-term current use of insulin (H)  E11.9 AMBULATORY ADULT DIABETES EDUCATOR REFERRAL     aspirin (ASA) 81 MG EC tablet     Albumin Random Urine Quantitative with Creat Ratio   3. Hyperlipidemia LDL goal <100  E78.5 Lipid panel reflex to direct LDL Fasting     ALT     AST   4. Smoking  F17.200 varenicline (CHANTIX MUNIRA) 0.5 MG X 11 & 1 MG X 42 tablet     varenicline (CHANTIX) 1 MG tablet   5. Snoring  R06.83 SLEEP EVALUATION & MANAGEMENT REFERRAL - [facility]  [phone] (Age 15 and up)   6. Excessive daytime sleepiness  G47.19 SLEEP EVALUATION & MANAGEMENT REFERRAL - [facility]  [phone] (Age 15 and up)   7. Obesity (BMI 30.0-34.9)  E66.9    8. Gastroesophageal reflux disease with esophagitis without hemorrhage  K21.00    9. Constipation, unspecified constipation type  K59.00    10. Cutaneous skin tags  L91.8      We discussed the above items  We need to start treatment for his type 2 diabetes  I will refer him to our diabetes educator for further instruction on diet and exercise treatment for his diabetes  I recommended he start low-dose aspirin  We will have him return soon for fasting lab work and  "we will likely start him on a statin then  Continue same GERD meds  He likely has MAGI, so I will refer him to our sleep clinic for further evaluation of that  I recommended increased fiber and fluids for his constipation  I reassured him about the skin tags  No treatment needed for them unless they become bothersome  Plan a tentative recheck in 3 months    Patient has been advised of split billing requirements and indicates understanding: Yes  COUNSELING:   Reviewed preventive health counseling, as reflected in patient instructions       Regular exercise       Healthy diet/nutrition    Estimated body mass index is 32.98 kg/m  as calculated from the following:    Height as of this encounter: 1.666 m (5' 5.59\").    Weight as of this encounter: 91.5 kg (201 lb 12.8 oz).     Weight management plan: Discussed healthy diet and exercise guidelines    He reports that he has been smoking cigarettes. He has never used smokeless tobacco.  Tobacco Cessation Action Plan:   Pharmacotherapies : Chantix      Counseling Resources:  ATP IV Guidelines  Pooled Cohorts Equation Calculator  FRAX Risk Assessment  ICSI Preventive Guidelines  Dietary Guidelines for Americans, 2010  USDA's MyPlate  ASA Prophylaxis  Lung CA Screening    Jairo Clay MD  [facility]      Answers for HPI/ROS submitted by the patient on 4/19/2021   Annual Exam:  If you checked off any problems, how difficult have these problems made it for you to do your work, take care of things at home, or get along with other people?: Not difficult at all  PHQ9 TOTAL SCORE: 18    "

## 2021-04-20 ENCOUNTER — TELEPHONE (OUTPATIENT)
Dept: FAMILY MEDICINE | Facility: CLINIC | Age: 46
End: 2021-04-20

## 2021-04-20 ASSESSMENT — PATIENT HEALTH QUESTIONNAIRE - PHQ9: SUM OF ALL RESPONSES TO PHQ QUESTIONS 1-9: 18

## 2021-04-20 NOTE — TELEPHONE ENCOUNTER
Diabetes Education Scheduling Outreach #1:    Call to patient to schedule. Left message with phone number to call to schedule.    Plan for 2nd outreach attempt within 1 week.    Anne Rogers  [facility]  Diabetes and Nutrition Scheduling

## 2021-04-26 DIAGNOSIS — E11.9 TYPE 2 DIABETES MELLITUS WITHOUT COMPLICATION, WITHOUT LONG-TERM CURRENT USE OF INSULIN (H): ICD-10-CM

## 2021-04-26 DIAGNOSIS — E78.5 HYPERLIPIDEMIA LDL GOAL <100: ICD-10-CM

## 2021-04-26 PROCEDURE — 36415 COLL VENOUS BLD VENIPUNCTURE: CPT | Performed by: FAMILY MEDICINE

## 2021-04-26 PROCEDURE — 84450 TRANSFERASE (AST) (SGOT): CPT | Performed by: FAMILY MEDICINE

## 2021-04-26 PROCEDURE — 82043 UR ALBUMIN QUANTITATIVE: CPT | Performed by: FAMILY MEDICINE

## 2021-04-26 PROCEDURE — 84460 ALANINE AMINO (ALT) (SGPT): CPT | Performed by: FAMILY MEDICINE

## 2021-04-26 PROCEDURE — 80061 LIPID PANEL: CPT | Performed by: FAMILY MEDICINE

## 2021-04-27 LAB
ALT SERPL W P-5'-P-CCNC: 50 U/L (ref 0–70)
AST SERPL W P-5'-P-CCNC: 28 U/L (ref 0–45)
CHOLEST SERPL-MCNC: 218 MG/DL
CREAT UR-MCNC: 190 MG/DL
HDLC SERPL-MCNC: 43 MG/DL
LDLC SERPL CALC-MCNC: 146 MG/DL
MICROALBUMIN UR-MCNC: 56 MG/L
MICROALBUMIN/CREAT UR: 29.42 MG/G CR (ref 0–17)
NONHDLC SERPL-MCNC: 175 MG/DL
TRIGL SERPL-MCNC: 143 MG/DL

## 2021-04-28 DIAGNOSIS — R80.9 TYPE 2 DIABETES MELLITUS WITH MICROALBUMINURIA, WITHOUT LONG-TERM CURRENT USE OF INSULIN (H): ICD-10-CM

## 2021-04-28 DIAGNOSIS — E78.5 HYPERLIPIDEMIA LDL GOAL <100: Primary | ICD-10-CM

## 2021-04-28 DIAGNOSIS — E11.29 TYPE 2 DIABETES MELLITUS WITH MICROALBUMINURIA, WITHOUT LONG-TERM CURRENT USE OF INSULIN (H): ICD-10-CM

## 2021-04-28 RX ORDER — ATORVASTATIN CALCIUM 20 MG/1
20 TABLET, FILM COATED ORAL DAILY
Qty: 90 TABLET | Refills: 3 | Status: SHIPPED | OUTPATIENT
Start: 2021-04-28 | End: 2022-05-15

## 2021-04-28 RX ORDER — LISINOPRIL 10 MG/1
10 TABLET ORAL DAILY
Qty: 90 TABLET | Refills: 3 | Status: SHIPPED | OUTPATIENT
Start: 2021-04-28 | End: 2022-05-15

## 2021-04-28 NOTE — RESULT ENCOUNTER NOTE
Trevor,  Your liver tests are normal.  Your cholesterol values are elevated, and given your underlying diabetes it is especially important to keep them low, so we should start you on a statin.  I sent a prescription for atorvastatin 20 mg daily to your [facility] pharmacy for this purpose.  You also have microalbumin in your urine, which usually indicates that diabetes is affecting your kidneys.  In order to preserve your kidney function and help keep your blood pressure well controlled, I also sent a prescription for lisinopril 10 mg daily to your pharmacy.  Please start these 2 new medications and take each one daily and then return in 3 months to see me for a fasting office visit to recheck some lab work.  In the meantime, please try to quit smoking and connect with our diabetes educator so that they can help make recommendations and educate you in ways to further control your diabetes.    Jairo Clay MD

## 2021-04-28 NOTE — PROGRESS NOTES
Lipid values are elevated and he has underlying type 2 diabetes, so we will start him on atorvastatin 20 mg daily.  He also has microalbuminuria, so we will start him on lisinopril.  Plan a recheck in 3 months.

## 2021-06-19 ENCOUNTER — HEALTH MAINTENANCE LETTER (OUTPATIENT)
Age: 46
End: 2021-06-19

## 2021-10-10 ENCOUNTER — HEALTH MAINTENANCE LETTER (OUTPATIENT)
Age: 46
End: 2021-10-10

## 2021-10-22 ENCOUNTER — OFFICE VISIT (OUTPATIENT)
Dept: FAMILY MEDICINE | Facility: CLINIC | Age: 46
End: 2021-10-22
Payer: COMMERCIAL

## 2021-10-22 VITALS
OXYGEN SATURATION: 99 % | TEMPERATURE: 98.8 F | SYSTOLIC BLOOD PRESSURE: 110 MMHG | DIASTOLIC BLOOD PRESSURE: 73 MMHG | BODY MASS INDEX: 31.54 KG/M2 | WEIGHT: 193 LBS | HEART RATE: 76 BPM

## 2021-10-22 DIAGNOSIS — K21.00 GASTROESOPHAGEAL REFLUX DISEASE WITH ESOPHAGITIS WITHOUT HEMORRHAGE: ICD-10-CM

## 2021-10-22 DIAGNOSIS — R06.83 SNORING: ICD-10-CM

## 2021-10-22 DIAGNOSIS — E78.5 HYPERLIPIDEMIA LDL GOAL <100: ICD-10-CM

## 2021-10-22 DIAGNOSIS — Z11.4 SCREENING FOR HIV (HUMAN IMMUNODEFICIENCY VIRUS): ICD-10-CM

## 2021-10-22 DIAGNOSIS — F17.200 SMOKING: ICD-10-CM

## 2021-10-22 DIAGNOSIS — R80.9 TYPE 2 DIABETES MELLITUS WITH MICROALBUMINURIA, WITHOUT LONG-TERM CURRENT USE OF INSULIN (H): Primary | ICD-10-CM

## 2021-10-22 DIAGNOSIS — Z23 NEED FOR PNEUMOCOCCAL VACCINATION: ICD-10-CM

## 2021-10-22 DIAGNOSIS — E66.811 OBESITY (BMI 30.0-34.9): ICD-10-CM

## 2021-10-22 DIAGNOSIS — Z11.59 NEED FOR HEPATITIS C SCREENING TEST: ICD-10-CM

## 2021-10-22 DIAGNOSIS — Z23 NEED FOR PROPHYLACTIC VACCINATION AND INOCULATION AGAINST INFLUENZA: ICD-10-CM

## 2021-10-22 DIAGNOSIS — E11.29 TYPE 2 DIABETES MELLITUS WITH MICROALBUMINURIA, WITHOUT LONG-TERM CURRENT USE OF INSULIN (H): Primary | ICD-10-CM

## 2021-10-22 LAB — HBA1C MFR BLD: 6.3 % (ref 0–5.6)

## 2021-10-22 PROCEDURE — 90732 PPSV23 VACC 2 YRS+ SUBQ/IM: CPT | Performed by: FAMILY MEDICINE

## 2021-10-22 PROCEDURE — 80048 BASIC METABOLIC PNL TOTAL CA: CPT | Performed by: FAMILY MEDICINE

## 2021-10-22 PROCEDURE — 90686 IIV4 VACC NO PRSV 0.5 ML IM: CPT | Performed by: FAMILY MEDICINE

## 2021-10-22 PROCEDURE — 90472 IMMUNIZATION ADMIN EACH ADD: CPT | Performed by: FAMILY MEDICINE

## 2021-10-22 PROCEDURE — 90471 IMMUNIZATION ADMIN: CPT | Performed by: FAMILY MEDICINE

## 2021-10-22 PROCEDURE — 80061 LIPID PANEL: CPT | Performed by: FAMILY MEDICINE

## 2021-10-22 PROCEDURE — 86803 HEPATITIS C AB TEST: CPT | Performed by: FAMILY MEDICINE

## 2021-10-22 PROCEDURE — 99214 OFFICE O/P EST MOD 30 MIN: CPT | Mod: 25 | Performed by: FAMILY MEDICINE

## 2021-10-22 PROCEDURE — 36415 COLL VENOUS BLD VENIPUNCTURE: CPT | Performed by: FAMILY MEDICINE

## 2021-10-22 PROCEDURE — 83036 HEMOGLOBIN GLYCOSYLATED A1C: CPT | Performed by: FAMILY MEDICINE

## 2021-10-22 PROCEDURE — 99207 PR FOOT EXAM NO CHARGE: CPT | Performed by: FAMILY MEDICINE

## 2021-10-22 PROCEDURE — 87389 HIV-1 AG W/HIV-1&-2 AB AG IA: CPT | Performed by: FAMILY MEDICINE

## 2021-10-22 ASSESSMENT — PATIENT HEALTH QUESTIONNAIRE - PHQ9: SUM OF ALL RESPONSES TO PHQ QUESTIONS 1-9: 7

## 2021-10-22 NOTE — PROGRESS NOTES
"  Assessment & Plan       ICD-10-CM    1. Type 2 diabetes mellitus with microalbuminuria, without long-term current use of insulin (H)  E11.29 HEMOGLOBIN A1C    R80.9 Basic metabolic panel     Basic metabolic panel     HEMOGLOBIN A1C     FOOT EXAM   2. Hyperlipidemia LDL goal <100  E78.5 Lipid panel reflex to direct LDL Non-fasting     Lipid panel reflex to direct LDL Non-fasting   3. Obesity (BMI 30.0-34.9)  E66.9    4. Snoring  R06.83    5. Gastroesophageal reflux disease with esophagitis without hemorrhage  K21.00    6. Smoking  F17.200    7. Screening for HIV (human immunodeficiency virus)  Z11.4 HIV Antigen Antibody Combo     HIV Antigen Antibody Combo   8. Need for hepatitis C screening test  Z11.59 Hepatitis C Screen Reflex to HCV RNA Quant and Genotype     Hepatitis C Screen Reflex to HCV RNA Quant and Genotype   9. Need for prophylactic vaccination and inoculation against influenza  Z23 INFLUENZA VACCINE IM > 6 MONTHS VALENT IIV4 (AFLURIA/FLUZONE)   10. Need for pneumococcal vaccination  Z23 PNEUMOCOCCAL 23 VALENT     Blood pressure and other vital signs look good  We discussed the above items  We will continue his same baseline meds  We will check some none fasting lab work today  We will give him a flu shot and a Pneumovax 23  He was advised to follow-up with Dr. Perez as per his recommendation on the GI symptoms and to schedule a visit with the sleep clinic for further evaluation of presumed MAGI  He was encouraged to cut down his smoking and eventually quit completely     BMI:   Estimated body mass index is 31.54 kg/m  as calculated from the following:    Height as of 4/19/21: 1.666 m (5' 5.59\").    Weight as of this encounter: 87.5 kg (193 lb).   Weight management plan: Discussed healthy diet and exercise guidelines      Return in about 6 months (around 4/22/2022) for Physical Exam, Lab Work, Routine Visit.    Jairo Clay MD  [facility] Murray is a 46 year " old who presents for the following health issues     HPI     Diabetes Follow-up      How often are you checking your blood sugar? Not at all    What concerns do you have today about your diabetes? None     Do you have any of these symptoms? (Select all that apply)  Blurry vision    Have you had a diabetic eye exam in the last 12 months? No        Hyperlipidemia Follow-Up      Are you regularly taking any medication or supplement to lower your cholesterol?   Yes- atorvastatin    Are you having muscle aches or other side effects that you think could be caused by your cholesterol lowering medication?  No    Hypertension Follow-up      Do you check your blood pressure regularly outside of the clinic? No     Are you following a low salt diet? Yes    Are your blood pressures ever more than 140 on the top number (systolic) OR more   than 90 on the bottom number (diastolic), for example 140/90?     BP Readings from Last 2 Encounters:   10/22/21 110/73   04/19/21 128/89     Hemoglobin A1C (%)   Date Value   02/18/2021 6.7 (H)   04/16/2018 6.1 (H)     LDL Cholesterol Calculated (mg/dL)   Date Value   04/26/2021 146 (H)   04/16/2018 99         How many servings of fruits and vegetables do you eat daily?  0-1    On average, how many sweetened beverages do you drink each day (Examples: soda, juice, sweet tea, etc.  Do NOT count diet or artificially sweetened beverages)?   0    How many days per week do you exercise enough to make your heart beat faster? 4    How many minutes a day do you exercise enough to make your heart beat faster? 10 - 19    How many days per week do you miss taking your medication? 0    He never did follow-up with the diabetes educator after I last saw him in April.  He has not been checking any home blood sugar readings.  He has been trying to eat healthier and he has lost some weight.  He has been taking the lisinopril and atorvastatin on a regular basis for his microalbuminuria and hyperlipidemia,  respectively.  He did undergo upper endoscopy earlier severe, but still has had upper abdominal pains despite the PPI and Carafate.  He was advised to follow-up with Dr. Perez if his symptoms persisted, so he plans to do that.  He is still smoking.  He is trying to cut down.  He still has not followed up with the sleep clinic for his probable sleep apnea.  He does feel tired during the day.  He can readily fall asleep.  We had him fill out a PHQ-9 form because of his tiredness.  He does not really feel depressed.  See answers to questionnaire in chart.    Patient Active Problem List   Diagnosis     Gastroesophageal reflux disease, esophagitis presence not specified     Hyperlipidemia LDL goal <100     Obesity (BMI 30.0-34.9)     Snoring     Smoking     Type 2 diabetes mellitus with microalbuminuria, without long-term current use of insulin (H)     Current Outpatient Medications   Medication     atorvastatin (LIPITOR) 20 MG tablet     Esomeprazole Magnesium (NEXIUM PO)     lisinopril (ZESTRIL) 10 MG tablet     sucralfate (CARAFATE) 1 GM tablet     aspirin (ASA) 81 MG EC tablet     No current facility-administered medications for this visit.         Review of Systems   Constitutional, HEENT, cardiovascular, pulmonary, gi and gu systems are negative, except as otherwise noted.      Objective    /73 (BP Location: Right arm, Patient Position: Sitting, Cuff Size: Adult Regular)   Pulse 76   Temp 98.8  F (37.1  C) (Oral)   Wt 87.5 kg (193 lb)   SpO2 99%   BMI 31.54 kg/m    Body mass index is 31.54 kg/m .  Physical Exam   GENERAL: alert, no distress and obese  ABDOMEN: soft, nontender, no hepatosplenomegaly, no masses and bowel sounds normal  EXT: No swelling of the feet.  No foot ulcers.  Peripheral pulses are intact.  Sensation in both feet is intact to light touch.  PSYCH: mentation appears normal, affect normal/bright.  He scored a 7 on his PHQ-9, mainly because of tiredness and overeating.    Previous  lab results were reviewed.

## 2021-10-25 LAB
ANION GAP SERPL CALCULATED.3IONS-SCNC: 8 MMOL/L (ref 3–14)
BUN SERPL-MCNC: 11 MG/DL (ref 7–30)
CALCIUM SERPL-MCNC: 9.4 MG/DL (ref 8.5–10.1)
CHLORIDE BLD-SCNC: 103 MMOL/L (ref 94–109)
CHOLEST SERPL-MCNC: 120 MG/DL
CO2 SERPL-SCNC: 24 MMOL/L (ref 20–32)
CREAT SERPL-MCNC: 0.88 MG/DL (ref 0.66–1.25)
FASTING STATUS PATIENT QL REPORTED: NO
GFR SERPL CREATININE-BSD FRML MDRD: >90 ML/MIN/1.73M2
GLUCOSE BLD-MCNC: 100 MG/DL (ref 70–99)
HCV AB SERPL QL IA: NONREACTIVE
HDLC SERPL-MCNC: 41 MG/DL
HIV 1+2 AB+HIV1 P24 AG SERPL QL IA: NONREACTIVE
LDLC SERPL CALC-MCNC: 58 MG/DL
NONHDLC SERPL-MCNC: 79 MG/DL
POTASSIUM BLD-SCNC: 3.9 MMOL/L (ref 3.4–5.3)
SODIUM SERPL-SCNC: 135 MMOL/L (ref 133–144)
TRIGL SERPL-MCNC: 106 MG/DL

## 2021-10-26 NOTE — RESULT ENCOUNTER NOTE
Trevor,  Your lab results look excellent.  Your cholesterol values are all nice and normal now.  Your diabetes is well controlled.  Hepatitis C and HIV screening tests are both negative/normal, as expected.  Electrolytes and kidney tests are normal.Please continue your same medications.  The main thing you to do now is to quit smoking.  Please try to accomplish that in the upcoming months and then return in about 6 months from now for an annual physical and some repeat lab work.    Jairo Clay MD

## 2021-10-31 ENCOUNTER — MYC MEDICAL ADVICE (OUTPATIENT)
Dept: FAMILY MEDICINE | Facility: CLINIC | Age: 46
End: 2021-10-31

## 2021-10-31 DIAGNOSIS — F17.200 SMOKING: Primary | ICD-10-CM

## 2021-11-01 RX ORDER — BUPROPION HYDROCHLORIDE 150 MG/1
TABLET, EXTENDED RELEASE ORAL
Qty: 60 TABLET | Refills: 1 | Status: SHIPPED | OUTPATIENT
Start: 2021-11-01 | End: 2022-06-16

## 2022-01-29 ENCOUNTER — HEALTH MAINTENANCE LETTER (OUTPATIENT)
Age: 47
End: 2022-01-29

## 2022-02-17 PROBLEM — K21.9 GASTROESOPHAGEAL REFLUX DISEASE: Status: ACTIVE | Noted: 2018-03-21

## 2022-05-14 DIAGNOSIS — E11.29 TYPE 2 DIABETES MELLITUS WITH MICROALBUMINURIA, WITHOUT LONG-TERM CURRENT USE OF INSULIN (H): ICD-10-CM

## 2022-05-14 DIAGNOSIS — E78.5 HYPERLIPIDEMIA LDL GOAL <100: ICD-10-CM

## 2022-05-14 DIAGNOSIS — R80.9 TYPE 2 DIABETES MELLITUS WITH MICROALBUMINURIA, WITHOUT LONG-TERM CURRENT USE OF INSULIN (H): ICD-10-CM

## 2022-05-15 RX ORDER — ATORVASTATIN CALCIUM 20 MG/1
TABLET, FILM COATED ORAL
Qty: 90 TABLET | Refills: 0 | Status: SHIPPED | OUTPATIENT
Start: 2022-05-15 | End: 2022-06-16

## 2022-05-15 RX ORDER — LISINOPRIL 10 MG/1
TABLET ORAL
Qty: 90 TABLET | Refills: 0 | Status: SHIPPED | OUTPATIENT
Start: 2022-05-15 | End: 2022-06-16

## 2022-05-15 NOTE — TELEPHONE ENCOUNTER
Medication is being filled for 1 time refill only due to:  Patient needs to be seen because overdue for annual exam.   Please CALL to schedule. Not reading my chart.  Haylie Wilde RN    Return in about 6 months (around 4/22/2022) for Physical Exam, Lab Work, Routine Visit.

## 2022-05-21 ENCOUNTER — HEALTH MAINTENANCE LETTER (OUTPATIENT)
Age: 47
End: 2022-05-21

## 2022-06-16 ENCOUNTER — OFFICE VISIT (OUTPATIENT)
Dept: FAMILY MEDICINE | Facility: CLINIC | Age: 47
End: 2022-06-16
Payer: COMMERCIAL

## 2022-06-16 VITALS
WEIGHT: 195 LBS | TEMPERATURE: 98.2 F | HEART RATE: 82 BPM | DIASTOLIC BLOOD PRESSURE: 80 MMHG | SYSTOLIC BLOOD PRESSURE: 110 MMHG | HEIGHT: 65 IN | BODY MASS INDEX: 32.49 KG/M2 | OXYGEN SATURATION: 100 %

## 2022-06-16 DIAGNOSIS — R06.83 SNORING: ICD-10-CM

## 2022-06-16 DIAGNOSIS — F17.200 TOBACCO USE DISORDER: ICD-10-CM

## 2022-06-16 DIAGNOSIS — Z00.00 ROUTINE GENERAL MEDICAL EXAMINATION AT A HEALTH CARE FACILITY: Primary | ICD-10-CM

## 2022-06-16 DIAGNOSIS — E78.5 HYPERLIPIDEMIA LDL GOAL <100: ICD-10-CM

## 2022-06-16 DIAGNOSIS — E66.811 OBESITY (BMI 30.0-34.9): ICD-10-CM

## 2022-06-16 DIAGNOSIS — R80.9 TYPE 2 DIABETES MELLITUS WITH MICROALBUMINURIA, WITHOUT LONG-TERM CURRENT USE OF INSULIN (H): ICD-10-CM

## 2022-06-16 DIAGNOSIS — E11.29 TYPE 2 DIABETES MELLITUS WITH MICROALBUMINURIA, WITHOUT LONG-TERM CURRENT USE OF INSULIN (H): ICD-10-CM

## 2022-06-16 DIAGNOSIS — M25.562 LEFT KNEE PAIN, UNSPECIFIED CHRONICITY: ICD-10-CM

## 2022-06-16 DIAGNOSIS — Z12.11 COLON CANCER SCREENING: ICD-10-CM

## 2022-06-16 DIAGNOSIS — K21.00 GASTROESOPHAGEAL REFLUX DISEASE WITH ESOPHAGITIS WITHOUT HEMORRHAGE: ICD-10-CM

## 2022-06-16 DIAGNOSIS — G47.19 EXCESSIVE DAYTIME SLEEPINESS: ICD-10-CM

## 2022-06-16 LAB — HBA1C MFR BLD: 6.6 % (ref 0–5.6)

## 2022-06-16 PROCEDURE — 99214 OFFICE O/P EST MOD 30 MIN: CPT | Mod: 25 | Performed by: FAMILY MEDICINE

## 2022-06-16 PROCEDURE — 99396 PREV VISIT EST AGE 40-64: CPT | Performed by: FAMILY MEDICINE

## 2022-06-16 PROCEDURE — 80048 BASIC METABOLIC PNL TOTAL CA: CPT | Performed by: FAMILY MEDICINE

## 2022-06-16 PROCEDURE — 82043 UR ALBUMIN QUANTITATIVE: CPT | Performed by: FAMILY MEDICINE

## 2022-06-16 PROCEDURE — 83036 HEMOGLOBIN GLYCOSYLATED A1C: CPT | Performed by: FAMILY MEDICINE

## 2022-06-16 PROCEDURE — 36415 COLL VENOUS BLD VENIPUNCTURE: CPT | Performed by: FAMILY MEDICINE

## 2022-06-16 RX ORDER — ATORVASTATIN CALCIUM 20 MG/1
20 TABLET, FILM COATED ORAL DAILY
Qty: 90 TABLET | Refills: 3 | Status: SHIPPED | OUTPATIENT
Start: 2022-06-16 | End: 2023-08-08

## 2022-06-16 RX ORDER — LISINOPRIL 10 MG/1
10 TABLET ORAL DAILY
Qty: 90 TABLET | Refills: 3 | Status: SHIPPED | OUTPATIENT
Start: 2022-06-16 | End: 2023-08-08

## 2022-06-16 ASSESSMENT — ENCOUNTER SYMPTOMS
DIZZINESS: 1
ARTHRALGIAS: 1
SHORTNESS OF BREATH: 1
HEARTBURN: 1
WEAKNESS: 1
PALPITATIONS: 1

## 2022-06-16 ASSESSMENT — PAIN SCALES - GENERAL: PAINLEVEL: NO PAIN (0)

## 2022-06-16 NOTE — PROGRESS NOTES
SUBJECTIVE:   CC: Trevor Wallace is an 46 year old male who presents for a preventative health visit and follow-up on baseline health conditions.       Patient has been advised of split billing requirements and indicates understanding: Yes  Healthy Habits:     Getting at least 3 servings of Calcium per day:  Yes    Bi-annual eye exam:  Yes    Dental care twice a year:  NO    Sleep apnea or symptoms of sleep apnea:  Daytime drowsiness and Excessive snoring    Diet:  Diabetic    Frequency of exercise:  None    Taking medications regularly:  Yes    Barriers to taking medications:  None    Medication side effects:  Muscle aches    PHQ-2 Total Score: 0    Additional concerns today:  Yes    Stomach issue and muscular pain and left knee pain.    He had an upper endoscopy done last year for stomach issues.  He is still taking Nexium 40 mg a day and that does seem to help.  Caffeinated beverages will trigger some reflux symptoms for him.    He has had some achiness in his shoulders, especially the left side.  When he sleeps on his left shoulder it bothers him.    Has been having some left knee pain in recent months.  Especially when going downstairs or walking down an incline.    He remains on baseline medications as below.  He has not been taking his low-dose aspirin because he did not have a prescription for it.  He is still smoking, but has cut way back.  He tried taking bupropion, but it seemed to cause constipation, so he stopped it.    Today's PHQ-2 Score:   PHQ-2 ( 1999 Pfizer) 6/16/2022   Q1: Little interest or pleasure in doing things 0   Q2: Feeling down, depressed or hopeless 0   PHQ-2 Score 0   PHQ-2 Total Score (12-17 Years)- Positive if 3 or more points; Administer PHQ-A if positive -   Q1: Little interest or pleasure in doing things Not at all   Q2: Feeling down, depressed or hopeless Not at all   PHQ-2 Score 0       Abuse: Current or Past(Physical, Sexual or Emotional)- No  Do you feel safe in your  environment? Yes        Social History     Tobacco Use     Smoking status: Current Some Day Smoker     Types: Cigarettes     Smokeless tobacco: Never Used   Substance Use Topics     Alcohol use: No     If you drink alcohol do you typically have >3 drinks per day or >7 drinks per week? No    Alcohol Use 6/16/2022   Prescreen: >3 drinks/day or >7 drinks/week? No       Last PSA:   PSA   Date Value Ref Range Status   04/16/2018 0.76 0 - 4 ug/L Final     Comment:     Assay Method:  Chemiluminescence using Siemens Vista analyzer       Reviewed orders with patient. Reviewed health maintenance and updated orders accordingly - Yes  Patient Active Problem List   Diagnosis     Gastroesophageal reflux disease, esophagitis presence not specified     Hyperlipidemia LDL goal <100     Obesity (BMI 30.0-34.9)     Snoring     Tobacco use disorder     Type 2 diabetes mellitus with microalbuminuria, without long-term current use of insulin (H)     Past Surgical History:   Procedure Laterality Date     COMBINED ESOPHAGOSCOPY, GASTROSCOPY, DUODENOSCOPY (EGD) WITH CO2 INSUFFLATION N/A 3/15/2021    Procedure: ESOPHAGOGASTRODUODENOSCOPY, WITH CO2 INSUFFLATION;  Surgeon: Mathew Laguna MD;  Location: [facility] OR     ESOPHAGOSCOPY, GASTROSCOPY, DUODENOSCOPY (EGD), COMBINED N/A 3/15/2021    Procedure: Esophagogastroduodenoscopy, With Biopsy;  Surgeon: Mathew Laguna MD;  Location: [facility] OR       Social History     Tobacco Use     Smoking status: Current Some Day Smoker     Types: Cigarettes     Smokeless tobacco: Never Used   Substance Use Topics     Alcohol use: No     Family History   Problem Relation Age of Onset     Hypertension Mother          Current Outpatient Medications   Medication Sig Dispense Refill     atorvastatin (LIPITOR) 20 MG tablet Take 1 tablet (20 mg) by mouth daily 90 tablet 3     esomeprazole (NEXIUM) 20 MG DR capsule Take 2 capsules (40 mg) by mouth every morning (before breakfast)       lisinopril (ZESTRIL) 10  "MG tablet Take 1 tablet (10 mg) by mouth daily 90 tablet 3     aspirin (ASA) 81 MG EC tablet Take 1 tablet (81 mg) by mouth daily (Patient not taking: No sig reported)       No Known Allergies    Reviewed and updated as needed this visit by clinical staff   Tobacco  Allergies  Meds  Problems  Med Hx  Surg Hx  Fam Hx  Soc   Hx          Reviewed and updated as needed this visit by Provider      Problems                  Review of Systems   Eyes: Positive for visual disturbance.   Respiratory: Positive for shortness of breath.    Cardiovascular: Positive for chest pain and palpitations.   Gastrointestinal: Positive for heartburn.   Genitourinary: Positive for urgency. Negative for impotence and penile discharge.   Musculoskeletal: Positive for arthralgias.   Neurological: Positive for dizziness and weakness.     He says he had an eye exam in November of last year.  He complains of excessive daytime sleepiness.  He can readily fall asleep during the day.  He snores loudly as well.  He has never had a sleep study.  I had referred him for one in the past, but he did not follow through with that.    OBJECTIVE:   /80 (BP Location: Left arm, Patient Position: Sitting, Cuff Size: Adult Regular)   Pulse 82   Temp 98.2  F (36.8  C) (Oral)   Ht 1.663 m (5' 5.47\")   Wt 88.5 kg (195 lb)   SpO2 100%   BMI 31.98 kg/m      Physical Exam  GENERAL: alert, no distress and over weight  EYES: Eyes grossly normal to inspection, PERRL and conjunctivae and sclerae normal  HENT: Grossly normal  NECK: no adenopathy, no asymmetry, masses, or scars and thyroid normal to palpation  RESP: lungs clear to auscultation - no rales, rhonchi or wheezes  CV: regular rate and rhythm, normal S1 S2, no S3 or S4, no murmur, click or rub, no peripheral edema and peripheral pulses strong  ABDOMEN: soft, nontender, no hepatosplenomegaly, no masses   MS: no gross musculoskeletal defects noted, no edema.  Mild impingement signs in the left " shoulder including some discomfort when abducting his left arm up overhead.  He points to the medial joint line area as to where he has been feeling left knee pain.  No knee swelling.  SKIN: no suspicious lesions or rashes  NEURO: Normal strength and tone, sensation in feet is intact to light touch, mentation intact and speech normal  PSYCH: mentation appears normal, affect normal/bright    Diagnostic Test Results:  Labs reviewed in Epic    ASSESSMENT/PLAN:       ICD-10-CM    1. Routine general medical examination at a health care facility  Z00.00    2. Type 2 diabetes mellitus with microalbuminuria, without long-term current use of insulin (H)  E11.29 lisinopril (ZESTRIL) 10 MG tablet    R80.9 Basic metabolic panel     Hemoglobin A1c     Albumin Random Urine Quantitative with Creat Ratio     Basic metabolic panel     Hemoglobin A1c     Albumin Random Urine Quantitative with Creat Ratio   3. Hyperlipidemia LDL goal <100  E78.5 atorvastatin (LIPITOR) 20 MG tablet   4. Obesity (BMI 30.0-34.9)  E66.9    5. Snoring  R06.83 Adult Sleep Eval & Management  Referral   6. Excessive daytime sleepiness  G47.19 Adult Sleep Eval & Management  Referral   7. Tobacco use disorder  F17.200    8. Gastroesophageal reflux disease with esophagitis without hemorrhage  K21.00    9. Left knee pain, unspecified chronicity  M25.562 XR Knee Left 3 Views   10. Colon cancer screening  Z12.11 Adult Gastro Ref - Procedure Only     Blood pressure and other vital signs look good  We discussed the above items  We will continue his same medications  He was advised to restart low-dose aspirin  We will check some nonfasting lab work today as above  I suspect he has MAGI, so I will refer him to our sleep clinic for further evaluation of that  I suspect his left knee pain is mainly due to arthritis, so we will check a left knee x-ray to evaluate for osteoarthritis  Discussed doing some exercises for his left shoulder and knee  I  "recommended colon cancer screening now that he is age 46 and he was agreeable to that, so a referral was made for a colonoscopy  He was encouraged to keep working on smoking cessation and to completely stop in the near future  Plan a tentative recheck in 6 months, or sooner prn    Patient has been advised of split billing requirements and indicates understanding: Yes    COUNSELING:   Reviewed preventive health counseling, as reflected in patient instructions       Regular exercise       Healthy diet/nutrition       Aspirin prophylaxis     Estimated body mass index is 31.98 kg/m  as calculated from the following:    Height as of this encounter: 1.663 m (5' 5.47\").    Weight as of this encounter: 88.5 kg (195 lb).     Weight management plan: Discussed healthy diet and exercise guidelines    He reports that he has been smoking cigarettes. He has never used smokeless tobacco.  Tobacco Cessation Action Plan:   Self help information given to patient      Counseling Resources:  ATP IV Guidelines  Pooled Cohorts Equation Calculator  FRAX Risk Assessment  ICSI Preventive Guidelines  Dietary Guidelines for Americans, 2010  USDA's MyPlate  ASA Prophylaxis  Lung CA Screening    Jairo Clay MD  [facility]  "

## 2022-06-17 LAB
ANION GAP SERPL CALCULATED.3IONS-SCNC: 5 MMOL/L (ref 3–14)
BUN SERPL-MCNC: 14 MG/DL (ref 7–30)
CALCIUM SERPL-MCNC: 9.3 MG/DL (ref 8.5–10.1)
CHLORIDE BLD-SCNC: 106 MMOL/L (ref 94–109)
CO2 SERPL-SCNC: 27 MMOL/L (ref 20–32)
CREAT SERPL-MCNC: 0.88 MG/DL (ref 0.66–1.25)
CREAT UR-MCNC: 64 MG/DL
GFR SERPL CREATININE-BSD FRML MDRD: >90 ML/MIN/1.73M2
GLUCOSE BLD-MCNC: 93 MG/DL (ref 70–99)
MICROALBUMIN UR-MCNC: 8 MG/L
MICROALBUMIN/CREAT UR: 12.5 MG/G CR (ref 0–17)
POTASSIUM BLD-SCNC: 4.5 MMOL/L (ref 3.4–5.3)
SODIUM SERPL-SCNC: 138 MMOL/L (ref 133–144)

## 2022-06-17 NOTE — RESULT ENCOUNTER NOTE
Trevor,  Your left knee x-ray does show some arthritis of the inner (medial) aspect of your left knee, which I suspect is causing your knee pain.  Doing some knee strengthening exercises and losing weight would be helpful for this.  You could also do some shoulder strengthening exercises as well.  You could look up knee and shoulder strengthening exercises online, or I could have you meet with a physical therapist and they could give you more specific instruction.  Let me know if you would like to do that and I can place a referral for it.  I will reach out to you again with another email once your lab results are all back.    Jairo Clay MD

## 2022-06-17 NOTE — PATIENT INSTRUCTIONS
HOW TO QUIT SMOKING  Smoking is one of the hardest habits to break. About half of all those who have ever smoked have been able to quit, and most of those (about 70%) who still smoke want to quit. Here are some of the best ways to stop smoking.     KEEP TRYING:  It takes most smokers about 8 tries before they are finally able to fully quit. So, the more often you try and fail, the better your chance of quitting the next time! So, don't give up!    GO COLD TURKEY:  Most ex-smokers quit cold turkey. Trying to cut back gradually doesn't seem to work as well, perhaps because it continues the smoking habit. Also, it is possible to fool yourself by inhaling more while smoking fewer cigarettes. This results in the same amount of nicotine in your body!    GET SUPPORT:  Support programs can make an important difference, especially for the heavy smoker. These groups offer lectures, methods to change your behavior and peer support. Call the free national Quitline for more information. 800-QUIT-NOW (543-603-5081). Low-cost or free programs are offered by many hospitals, local chapters of the American Lung Association (172-619-1179) and the American Cancer Society (756-150-6683). Support at home is important too. Non-smokers can help by offering praise and encouragement. If the smoker fails to quit, encourage them to try again!    OVER-THE-COUNTER MEDICINES:  For those who can't quit on their own, Nicotine Replacement Therapy (NRT) may make quitting much easier. Certain aids such as the nicotine patch, gum and lozenge are available without a prescription. However, it is best to use these under the guidance of your doctor. The skin patch provides a steady supply of nicotine to the body. Nicotine gum and lozenge gives temporary bursts of low levels of nicotine. Both methods take the edge off the craving for cigarettes. WARNING: If you feel symptoms of nicotine overdose, such as nausea, vomiting, dizziness, weakness, or fast  heartbeat, stop using these and see your doctor.    PRESCRIPTION MEDICINES:  After evaluating your smoking patterns and prior attempts at quitting, your doctor may offer a prescription medicine such as bupropion (Zyban, Wellbutrin), varenicline (Chantix, Champix), a niocotine inhaler or nasal spray. Each has its unique advantage and side effects which your doctor can review with you.    HEALTH BENEFITS OF QUITTING:  The benefits of quitting start right away and keep improving the longer you go without smokin minutes: blood pressure and pulse return to normal  8 hours: oxygen levels return to normal  2 days: ability to smell and taste begins to improve as damaged nerves start to regrow  2-3 weeks: circulation and lung function improves  1-9 months: decreased cough, congestion and shortness of breath; less tired  1 year: risk of heart attack decreases by half  5 years: risk of lung cancer decreases by half; risk of stroke becomes the same as a non-smoker  For information about how to quit smoking, visit the following links:  National Cancer [place] ,   Clearing the Air, Quit Smoking Today   - an online booklet. http://www.smokefree.gov/pubs/clearing_the_air.pdf  Smokefree.gov http://smokefree.gov/  QuitNet http://www.quitnet.com/    5713-4718 Nohemi Landon, [address] [place] [address], [place] [address]. All rights reserved. This information is not intended as a substitute for professional medical care. Always follow your healthcare professional's instructions.

## 2022-06-19 NOTE — RESULT ENCOUNTER NOTE
Trevor,  Your diabetes remains well controlled and the rest of your lab work is nice and normal.  Please continue your same medications.  I would advise another recheck in about 6 months.    Jairo Clay MD

## 2022-09-18 ENCOUNTER — HEALTH MAINTENANCE LETTER (OUTPATIENT)
Age: 47
End: 2022-09-18

## 2023-01-28 ENCOUNTER — HEALTH MAINTENANCE LETTER (OUTPATIENT)
Age: 48
End: 2023-01-28

## 2023-02-08 ENCOUNTER — OFFICE VISIT (OUTPATIENT)
Dept: FAMILY MEDICINE | Facility: CLINIC | Age: 48
End: 2023-02-08
Payer: COMMERCIAL

## 2023-02-08 VITALS
HEIGHT: 65 IN | TEMPERATURE: 98.2 F | BODY MASS INDEX: 32.99 KG/M2 | HEART RATE: 71 BPM | SYSTOLIC BLOOD PRESSURE: 99 MMHG | DIASTOLIC BLOOD PRESSURE: 62 MMHG | WEIGHT: 198 LBS | OXYGEN SATURATION: 96 %

## 2023-02-08 DIAGNOSIS — K21.00 GASTROESOPHAGEAL REFLUX DISEASE WITH ESOPHAGITIS WITHOUT HEMORRHAGE: ICD-10-CM

## 2023-02-08 DIAGNOSIS — F17.200 TOBACCO USE DISORDER: ICD-10-CM

## 2023-02-08 DIAGNOSIS — R80.9 TYPE 2 DIABETES MELLITUS WITH MICROALBUMINURIA, WITHOUT LONG-TERM CURRENT USE OF INSULIN (H): ICD-10-CM

## 2023-02-08 DIAGNOSIS — E78.5 HYPERLIPIDEMIA LDL GOAL <100: ICD-10-CM

## 2023-02-08 DIAGNOSIS — R06.83 SNORING: ICD-10-CM

## 2023-02-08 DIAGNOSIS — Z23 NEED FOR PROPHYLACTIC VACCINATION AND INOCULATION AGAINST INFLUENZA: ICD-10-CM

## 2023-02-08 DIAGNOSIS — E66.811 OBESITY (BMI 30.0-34.9): ICD-10-CM

## 2023-02-08 DIAGNOSIS — R10.13 ABDOMINAL PAIN, EPIGASTRIC: Primary | ICD-10-CM

## 2023-02-08 DIAGNOSIS — Z12.11 SCREEN FOR COLON CANCER: ICD-10-CM

## 2023-02-08 DIAGNOSIS — E11.29 TYPE 2 DIABETES MELLITUS WITH MICROALBUMINURIA, WITHOUT LONG-TERM CURRENT USE OF INSULIN (H): ICD-10-CM

## 2023-02-08 LAB — HBA1C MFR BLD: 6.5 % (ref 0–5.6)

## 2023-02-08 PROCEDURE — 80048 BASIC METABOLIC PNL TOTAL CA: CPT | Performed by: FAMILY MEDICINE

## 2023-02-08 PROCEDURE — 0124A COVID-19 VACCINE BIVALENT BOOSTER 12+ (PFIZER): CPT | Performed by: FAMILY MEDICINE

## 2023-02-08 PROCEDURE — 80061 LIPID PANEL: CPT | Performed by: FAMILY MEDICINE

## 2023-02-08 PROCEDURE — 90686 IIV4 VACC NO PRSV 0.5 ML IM: CPT | Performed by: FAMILY MEDICINE

## 2023-02-08 PROCEDURE — 91312 COVID-19 VACCINE BIVALENT BOOSTER 12+ (PFIZER): CPT | Performed by: FAMILY MEDICINE

## 2023-02-08 PROCEDURE — 83036 HEMOGLOBIN GLYCOSYLATED A1C: CPT | Performed by: FAMILY MEDICINE

## 2023-02-08 PROCEDURE — 36415 COLL VENOUS BLD VENIPUNCTURE: CPT | Performed by: FAMILY MEDICINE

## 2023-02-08 PROCEDURE — 99214 OFFICE O/P EST MOD 30 MIN: CPT | Mod: 25 | Performed by: FAMILY MEDICINE

## 2023-02-08 PROCEDURE — 90471 IMMUNIZATION ADMIN: CPT | Performed by: FAMILY MEDICINE

## 2023-02-08 ASSESSMENT — ENCOUNTER SYMPTOMS: ABDOMINAL PAIN: 1

## 2023-02-08 ASSESSMENT — PAIN SCALES - GENERAL: PAINLEVEL: NO PAIN (0)

## 2023-02-08 NOTE — PROGRESS NOTES
"  Assessment & Plan       ICD-10-CM    1. Abdominal pain, epigastric  R10.13       2. Type 2 diabetes mellitus with microalbuminuria, without long-term current use of insulin (H)  E11.29 Adult Eye  Referral    R80.9 HEMOGLOBIN A1C     Basic metabolic panel     HEMOGLOBIN A1C     Basic metabolic panel      3. Hyperlipidemia LDL goal <100  E78.5 Lipid panel reflex to direct LDL Fasting     Lipid panel reflex to direct LDL Fasting      4. Gastroesophageal reflux disease with esophagitis without hemorrhage  K21.00       5. Tobacco use disorder  F17.200       6. Obesity (BMI 30.0-34.9)  E66.9       7. Snoring  R06.83 Adult Sleep Eval & Management  Referral      8. Screen for colon cancer  Z12.11 Colonoscopy Screening  Referral      9. Need for prophylactic vaccination and inoculation against influenza  Z23 INFLUENZA VACCINE IM > 6 MONTHS VALENT IIV4 (AFLURIA/FLUZONE)        I suspect his midepigastric abdominal pain is related to underlying GERD  I advised him to stop smoking and to reduce his caffeine intake and avoid eating late at night  Continue same Nexium for now  Consider use of antiacids if pains recur  We will check some nonfasting lab tests today as above  We discussed vaccines and we will give him a flu shot and a COVID vaccine booster dose today  I will place new referrals for a colonoscopy and a sleep evaluation  Plan a tentative recheck in 4 to 6 months with another physical and repeat labs         BMI:   Estimated body mass index is 32.55 kg/m  as calculated from the following:    Height as of this encounter: 1.661 m (5' 5.4\").    Weight as of this encounter: 89.8 kg (198 lb).   Weight management plan: Discussed healthy diet and exercise guidelines      Return in about 5 months (around 7/8/2023) for Physical Exam, Lab Work, Routine Visit, diabetes recheck.    Jairo Clay MD  [facility] CHER Murray is a 47 year old, presenting for the " following health issues:  Abdominal Pain and Imm/Inj (Flu Shot)      Abdominal Pain     Imm/Inj  Associated symptoms include abdominal pain.   History of Present Illness       Reason for visit:  Jb in stomach    He eats 0-1 servings of fruits and vegetables daily.He consumes 0 sweetened beverage(s) daily.He exercises with enough effort to increase his heart rate 9 or less minutes per day.  He exercises with enough effort to increase his heart rate 3 or less days per week. He is missing 2 dose(s) of medications per week.       Pain History:  When did you first notice your pain? - Acute Pain   Have you seen anyone else for your pain? No  Where in your body do you have pain? Abdominal/Flank Pain  Onset/Duration: 1 month  Description:   Character: Sharp and squeezing  Location: epigastric region  Radiation: None  Intensity: severe  Progression of Symptoms:  improving and intermittent  Accompanying Signs & Symptoms:  Fever/Chills: No  Gas/Bloating: YES  Nausea: YES  Vomitting: No  Diarrhea: No  Constipation: No  Dysuria or Hematuria: No  History:   Trauma: No  Previous similar pain: No  Previous tests done: none  Precipitating factors:   Does the pain change with:     Food: YES- improves his nausea    Bowel Movement: YES- better    Urination: No   Other factors:  No  Therapies tried and outcome: None    He has had 3 episodes of sharp midepigastric pain in the last month or so.  The episodes generally last for half an hour to a few hours.  If he drinks water, then that he gets relief.  He has not tried any antacids.  He has not noticed any obvious food or drink triggers for these episodes.  He does take Nexium on a daily basis for history of GERD and esophagitis.  He does continue to smoke, generally up to half pack per day.  He never did follow through with a colonoscopy or a sleep evaluation.  He does still have some trouble sleeping.  He snores.  He has had some muscle cramps at times.  He has diet-controlled  "diabetes.  It has generally been well controlled.  He is due for some routine vaccines.    Review of Systems   Gastrointestinal: Positive for abdominal pain.            Objective    BP 99/62 (BP Location: Right arm, Patient Position: Sitting, Cuff Size: Adult Regular)   Pulse 71   Temp 98.2  F (36.8  C) (Oral)   Ht 1.661 m (5' 5.4\")   Wt 89.8 kg (198 lb)   SpO2 96%   BMI 32.55 kg/m    Body mass index is 32.55 kg/m .  Physical Exam   GENERAL: alert, no distress and over weight  ABDOMEN: soft, nontender, no hepatosplenomegaly, no masses  MS: no gross musculoskeletal defects noted, no edema    Past lab values were reviewed.                "

## 2023-02-09 LAB
ANION GAP SERPL CALCULATED.3IONS-SCNC: 8 MMOL/L (ref 3–14)
BUN SERPL-MCNC: 11 MG/DL (ref 7–30)
CALCIUM SERPL-MCNC: 9.7 MG/DL (ref 8.5–10.1)
CHLORIDE BLD-SCNC: 106 MMOL/L (ref 94–109)
CHOLEST SERPL-MCNC: 146 MG/DL
CO2 SERPL-SCNC: 26 MMOL/L (ref 20–32)
CREAT SERPL-MCNC: 0.88 MG/DL (ref 0.66–1.25)
FASTING STATUS PATIENT QL REPORTED: NO
GFR SERPL CREATININE-BSD FRML MDRD: >90 ML/MIN/1.73M2
GLUCOSE BLD-MCNC: 93 MG/DL (ref 70–99)
HDLC SERPL-MCNC: 52 MG/DL
LDLC SERPL CALC-MCNC: 63 MG/DL
NONHDLC SERPL-MCNC: 94 MG/DL
POTASSIUM BLD-SCNC: 4.2 MMOL/L (ref 3.4–5.3)
SODIUM SERPL-SCNC: 140 MMOL/L (ref 133–144)
TRIGL SERPL-MCNC: 157 MG/DL

## 2023-02-09 NOTE — RESULT ENCOUNTER NOTE
Trevor,  Your lab results look good and reassuring and show no particular areas of concern.  Your diabetes remains well controlled.  Please return in 4 to 6 months for an annual physical and some repeat lab work.    Jairo Clay MD

## 2023-04-05 ENCOUNTER — HOSPITAL ENCOUNTER (OUTPATIENT)
Facility: AMBULATORY SURGERY CENTER | Age: 48
End: 2023-04-05
Attending: FAMILY MEDICINE | Admitting: FAMILY MEDICINE
Payer: COMMERCIAL

## 2023-04-05 ENCOUNTER — TELEPHONE (OUTPATIENT)
Dept: GASTROENTEROLOGY | Facility: CLINIC | Age: 48
End: 2023-04-05
Payer: COMMERCIAL

## 2023-04-05 NOTE — TELEPHONE ENCOUNTER
Screening Questions  BLUE  KIND OF PREP RED  LOCATION [review exclusion criteria] GREEN  SEDATION TYPE        Y Are you active on mychart?       ANTHONY CASTELLON Ordering/Referring Provider?        BP What type of coverage do you have?      N Have you had a positive covid test in the last 14 days?     32.9 1. BMI  [BMI 40+ - review exclusion criteria]    Y  2. Are you able to give consent for your medical care? [IF NO,RN REVIEW]          N  3. Are you taking any prescription pain medications on a routine schedule   (ex narcotics: oxycodone, roxicodone, oxycontin,  and percocet)? [RN Review]          3a. EXTENDED PREP What kind of prescription?     N 4. Do you have any chemical dependencies such as alcohol, street drugs, or methadone?        **If yes 3- 5 , please schedule with MAC sedation.**          IF YES TO ANY 6 - 10 - HOSPITAL SETTING ONLY.     N 6.   Do you need assistance transferring?     N 7.   Have you had a heart or lung transplant?    N 8.   Are you currently on dialysis?   N 9.   Do you use daily home oxygen?   N 10. Do you take nitroglycerin?   10a.  If yes, how often?     11. [FEMALES]   Are you currently pregnant?    11a.  If yes, how many weeks? [ Greater than 12 weeks, OR NEEDED]    N 12. Do you have Pulmonary Hypertension? *NEED PAC APPT AT [facility] w/ MAC*     N 13. [review exclusion criteria]  Do you have any implantable devices in your body (pacemaker, defib, LVAD)?    N 14. In the past 6 months, have you had any heart related issues including cardiomyopathy or heart attack?     14a.  If yes, did it require cardiac stenting if so when?     N 15. Have you had a stroke or Transient ischemic attack (TIA - aka  mini stroke ) within 6 months?      N 16. Do you have mod to severe Obstructive Sleep Apnea?  [Hospital only]    N 17. Do you have SEVERE AND UNCONTROLLED asthma? *NEED PAC APPT AT [facility] w/MAC*     18. Are you currently taking any blood thinners?     18a. No. Continue to 19.   18b.    N 19.  "Do you take the medication Phentermine?    19a. If yes, \"Hold for 7 days before procedure.  Please consult your prescribing provider if you have questions about holding this medication.\"     N  20. Do you have chronic kidney disease?      Y  21. Do you have a diagnosis of diabetes?     N  22. On a regular basis do you go 3-5 days between bowel movements?      23. Preferred LOCAL Pharmacy for Pre Prescription    [ LIST ONLY ONE PHARMACY]          [id number] - [place], [place] - [address] [address] AT [address]        - [facility] REMINDERS -    Informed patient they will need an adult    Cannot take any type of public or medical transportation alone    Conscious Sedation- Needs  for 6 hours after the procedure       MAC/General-Needs  for 24 hours after procedure    Pre-Procedure Covid test to be completed [[facility] PCR Testing Required]    Confirmed Nurse will call to complete assessment       - SCHEDULING DETAILS -  N Hospital Setting Required? If yes, what is the exclusion?:    JER   Surgeon    6/16  Date of Procedure  Lower Endoscopy [Colonoscopy]  Type of Procedure Scheduled  [facility] Colonoscopy Prep was Sent?     CS Sedation Type     N PAC / Pre-op Required                 "

## 2023-05-17 ENCOUNTER — PATIENT OUTREACH (OUTPATIENT)
Dept: CARE COORDINATION | Facility: CLINIC | Age: 48
End: 2023-05-17
Payer: COMMERCIAL

## 2023-06-01 ENCOUNTER — PATIENT OUTREACH (OUTPATIENT)
Dept: CARE COORDINATION | Facility: CLINIC | Age: 48
End: 2023-06-01
Payer: COMMERCIAL

## 2023-06-01 ENCOUNTER — TELEPHONE (OUTPATIENT)
Dept: GASTROENTEROLOGY | Facility: CLINIC | Age: 48
End: 2023-06-01

## 2023-06-01 DIAGNOSIS — Z12.11 SCREEN FOR COLON CANCER: Primary | ICD-10-CM

## 2023-06-01 RX ORDER — BISACODYL 5 MG/1
TABLET, DELAYED RELEASE ORAL
Qty: 4 TABLET | Refills: 0 | Status: SHIPPED | OUTPATIENT
Start: 2023-06-01 | End: 2024-08-26

## 2023-06-01 NOTE — TELEPHONE ENCOUNTER
Attempted to contact patient regarding upcoming Colonoscopy  procedure on 06.16.2023 for pre assessment questions. No answer.     Left message to return call to [phone] #4    Discuss Covid policy and designated  policy.    Pre op exam? N/A    Arrival time: 0730. Procedure time: 0815    Facility location: [facility]; [address], [place] [address]    Sedation type: Conscious sedation     NSAIDs? No NSAID medications per patient's medication list.  RN will verify with pre-assessment call.    Anticoagulants: No    Electronic implanted devices? No    Diabetic? Yes-no medications    Indication for procedure: screening colonoscopy    Bowel prep recommendation: Standard Golytely  d/t DM    Prep instructions sent via [employer]. Bowel prep script sent to      [id number] - [place], [place] - [address] AT [address]      Bettie Martinez RN  Endoscopy Procedure Pre Assessment RN

## 2023-06-03 PROBLEM — R80.9 TYPE 2 DIABETES MELLITUS WITH MICROALBUMINURIA, WITHOUT LONG-TERM CURRENT USE OF INSULIN (H): Chronic | Status: ACTIVE | Noted: 2021-04-19

## 2023-06-03 PROBLEM — E78.5 HYPERLIPIDEMIA LDL GOAL <100: Chronic | Status: ACTIVE | Noted: 2018-03-21

## 2023-06-03 PROBLEM — K21.9 GASTROESOPHAGEAL REFLUX DISEASE: Chronic | Status: ACTIVE | Noted: 2018-03-21

## 2023-06-03 PROBLEM — E11.29 TYPE 2 DIABETES MELLITUS WITH MICROALBUMINURIA, WITHOUT LONG-TERM CURRENT USE OF INSULIN (H): Chronic | Status: ACTIVE | Noted: 2021-04-19

## 2023-06-03 PROBLEM — K21.00 GASTROESOPHAGEAL REFLUX DISEASE WITH ESOPHAGITIS: Status: ACTIVE | Noted: 2017-09-08

## 2023-06-07 ENCOUNTER — TELEPHONE (OUTPATIENT)
Dept: GASTROENTEROLOGY | Facility: CLINIC | Age: 48
End: 2023-06-07
Payer: COMMERCIAL

## 2023-06-07 NOTE — TELEPHONE ENCOUNTER
Called patient to go over pre assessment but he needs to reschedule appointment.  Transferred to endo scheduling.     Yulisa Martinez RN

## 2023-06-07 NOTE — TELEPHONE ENCOUNTER
Caller: Trevor Wallace   Reason for Reschedule/Cancellation (please be detailed, any staff messages or encounters to note?):     PER PT -- CHANGE DATE TO LATER AUG 2023      Prior to reschedule please review:    Ordering Provider:Jairo Clay MD     Sedation per order:MODERATE     Does patient have any ASC Exclusions, please identify?: N       Notes on Cancelled Procedure:  1. Procedure:Lower Endoscopy [Colonoscopy]   2. Date: 06/16/2023  3. Location:[facility]; [address], [place] [address]  4. Surgeon: JER         Rescheduled: YES     Procedure: Lower Endoscopy [Colonoscopy]    Date: 08/18/2023    Location:[facility]; [address], [place] [address]    Surgeon: JER     Sedation Level Scheduled  MODERATE          Reason for Sedation Level PER ORDER     Prep/Instructions updated and sent: [employer]     **DROPPED CALL AT THE END DURING TIME OF CALL -- PT MAY CALL BACK TO CONFIRM DATE/TIME.

## 2023-06-17 NOTE — PATIENT INSTRUCTIONS
Preventive Health Recommendations  Male Ages 40 to 49    Yearly exam:             See your health care provider every year in order to  o   Review health changes.   o   Discuss preventive care.    o   Review your medicines if your doctor has prescribed any.    You should be tested each year for STDs (sexually transmitted diseases) if you re at risk.     Have a cholesterol test every 5 years.     Have a colonoscopy (test for colon cancer) if someone in your family has had colon cancer or polyps before age 50.     After age 45, have a diabetes test (fasting glucose). If you are at risk for diabetes, you should have this test every 3 years.      Talk with your health care provider about whether or not a prostate cancer screening test (PSA) is right for you.    Shots: Get a flu shot each year. Get a tetanus shot every 10 years.     Nutrition:    Eat at least 5 servings of fruits and vegetables daily.     Eat whole-grain bread, whole-wheat pasta and brown rice instead of white grains and rice.     Talk to your provider about Calcium and Vitamin D.     Lifestyle    Exercise for at least 150 minutes a week (30 minutes a day, 5 days a week). This will help you control your weight and prevent disease.     Limit alcohol to one drink per day.     No smoking.     Wear sunscreen to prevent skin cancer.     See your dentist every six months for an exam and cleaning.       Universal Safety Interventions

## 2023-07-30 ENCOUNTER — HEALTH MAINTENANCE LETTER (OUTPATIENT)
Age: 48
End: 2023-07-30

## 2023-08-03 NOTE — TELEPHONE ENCOUNTER
Reschedule:    Pre visit planning completed.      Procedure details:    Patient scheduled for Colonoscopy  on 8/18/23.     Arrival time: 0730. Procedure time 0815    Pre op exam needed? N/A    Facility location: [facility]; [address], [place] [address]    Sedation type: Conscious sedation       See below for additional information.     Li Lemos RN   Endoscopy Procedure Pre Assessment RN

## 2023-08-04 NOTE — TELEPHONE ENCOUNTER
Called the Pt to complete Pre-Assessment. First Attempt. No answer, Left message.     Li Lemos RN   Endoscopy Procedure Pre Assessment RN

## 2023-08-07 DIAGNOSIS — R80.9 TYPE 2 DIABETES MELLITUS WITH MICROALBUMINURIA, WITHOUT LONG-TERM CURRENT USE OF INSULIN (H): ICD-10-CM

## 2023-08-07 DIAGNOSIS — E78.5 HYPERLIPIDEMIA LDL GOAL <100: ICD-10-CM

## 2023-08-07 DIAGNOSIS — E11.29 TYPE 2 DIABETES MELLITUS WITH MICROALBUMINURIA, WITHOUT LONG-TERM CURRENT USE OF INSULIN (H): ICD-10-CM

## 2023-08-08 RX ORDER — ATORVASTATIN CALCIUM 20 MG/1
20 TABLET, FILM COATED ORAL DAILY
Qty: 90 TABLET | Refills: 1 | Status: SHIPPED | OUTPATIENT
Start: 2023-08-08 | End: 2024-09-11

## 2023-08-08 RX ORDER — LISINOPRIL 10 MG/1
10 TABLET ORAL DAILY
Qty: 90 TABLET | Refills: 1 | Status: SHIPPED | OUTPATIENT
Start: 2023-08-08 | End: 2024-09-11

## 2023-08-09 ENCOUNTER — TELEPHONE (OUTPATIENT)
Dept: GASTROENTEROLOGY | Facility: CLINIC | Age: 48
End: 2023-08-09
Payer: COMMERCIAL

## 2023-08-09 NOTE — TELEPHONE ENCOUNTER
Caller:   Reason for Reschedule/Cancellation (please be detailed, any staff messages or encounters to note?): Bettie Martinez RN  P Endoscopy Scheduling Pool  Called patient to go over pre assessment but he would like to cancel appointment at this time.  Will call back at a later time to reschedule.    Patient is scheduled for a colonoscopy at  on 08.18.2023.    Thank you,  Yulisa Martinez RN      Prior to reschedule please review:  Ordering Provider:     ANTHONY CASTELLON     Sedation per order: CS  Does patient have any ASC Exclusions, please identify?:       Notes on Cancelled Procedure:  Procedure: Lower Endoscopy [Colonoscopy]   Date: 8/18  Location: [facility]; [address], [place] [address]  Surgeon: JER      Rescheduled: No  Procedure:    Date:   Location:   Surgeon:   Sedation Level Scheduled  ,  Reason for Sedation Level   Prep/Instructions updated and sent:      Send In - basket message to Panc - Lucio Pool if EUS  procedure is canceled or rescheduled: [ N/A, YES or NO]

## 2023-08-09 NOTE — TELEPHONE ENCOUNTER
Called patient to go over pre assessment but he would like to cancel appointment at this time.  Will call back at a later time to reschedule.     Sent message to endo scheduling to cancel appointment.      Yulisa Martinez RN

## 2023-10-08 ENCOUNTER — HEALTH MAINTENANCE LETTER (OUTPATIENT)
Age: 48
End: 2023-10-08

## 2023-11-01 ENCOUNTER — OFFICE VISIT (OUTPATIENT)
Dept: URGENT CARE | Facility: URGENT CARE | Age: 48
End: 2023-11-01
Payer: COMMERCIAL

## 2023-11-01 VITALS
WEIGHT: 201 LBS | HEART RATE: 86 BPM | SYSTOLIC BLOOD PRESSURE: 115 MMHG | OXYGEN SATURATION: 100 % | BODY MASS INDEX: 33.04 KG/M2 | RESPIRATION RATE: 18 BRPM | TEMPERATURE: 97.4 F | DIASTOLIC BLOOD PRESSURE: 78 MMHG

## 2023-11-01 DIAGNOSIS — R14.0 BLOATING: Primary | ICD-10-CM

## 2023-11-01 PROCEDURE — 99207 PR NO CHARGE LOS: CPT | Performed by: PHYSICIAN ASSISTANT

## 2023-11-01 ASSESSMENT — ENCOUNTER SYMPTOMS
CHEST TIGHTNESS: 0
DIARRHEA: 0
NAUSEA: 0
WHEEZING: 0
CARDIOVASCULAR NEGATIVE: 1
HEADACHES: 0
FREQUENCY: 0
DYSURIA: 0
COUGH: 0
ALLERGIC/IMMUNOLOGIC NEGATIVE: 1
CHILLS: 0
VOMITING: 0
MUSCULOSKELETAL NEGATIVE: 1
ABDOMINAL PAIN: 1
CONSTITUTIONAL NEGATIVE: 1
MYALGIAS: 0
SHORTNESS OF BREATH: 0
RESPIRATORY NEGATIVE: 1
FEVER: 0
SORE THROAT: 0
PALPITATIONS: 0
HEMATURIA: 0
NEUROLOGICAL NEGATIVE: 1

## 2023-11-01 NOTE — PROGRESS NOTES
Patient will see his PCP as this is an ongoing issue.  Patient encouraged to stop cancelling his colonoscopy.    Barrett Cruz PA-C

## 2023-11-01 NOTE — PROGRESS NOTES
Chief Complaint:    Chief Complaint   Patient presents with    Urgent Care    Abdominal Pain     Per patient states he has been having episodes of abdominal pain since last year, has been taking nexium but not helping still feels discomfort and bloating. This episode started yesterday      Medical Decision Making:    Vital signs reviewed by Barrett Cruz PA-C  /78   Pulse 86   Temp 97.4  F (36.3  C) (Tympanic)   Resp 18   Wt 91.2 kg (201 lb)   SpO2 100%   BMI 33.04 kg/m      Differential Diagnosis:  {UC Differential Choices:801996}      ASSESSMENT:     No diagnosis found.       PLAN:     ***  Patient instructed to follow up with PCP in 1 week if symptoms are not improving.  Sooner if symptoms worsen.  Worrisome symptoms discussed with instructions to go to the ED.  Patient verbalized understanding and agreed with this plan.    Labs:     No results found for any visits on 11/01/23.    Current Meds:    Current Outpatient Medications:     aspirin (ASA) 81 MG EC tablet, Take 1 tablet (81 mg) by mouth daily, Disp: , Rfl:     atorvastatin (LIPITOR) 20 MG tablet, TAKE 1 TABLET(20 MG) BY MOUTH DAILY, Disp: 90 tablet, Rfl: 1    esomeprazole (NEXIUM) 20 MG DR capsule, Take 2 capsules (40 mg) by mouth every morning (before breakfast), Disp: , Rfl:     lisinopril (ZESTRIL) 10 MG tablet, TAKE 1 TABLET(10 MG) BY MOUTH DAILY, Disp: 90 tablet, Rfl: 1    bisacodyl (DULCOLAX) 5 MG EC tablet, Take 2 tablets at 3 pm the day before your procedure. If your procedure is before 11 am, take 2 additional tablets at 11 pm. If your procedure is after 11 am, take 2 additional tablets at 6 am. For additional instructions refer to your colonoscopy prep instructions. (Patient not taking: Reported on 11/1/2023), Disp: 4 tablet, Rfl: 0    polyethylene glycol (GOLYTELY) 236 g suspension, The night before the exam at 6 pm drink an 8-ounce glass every 15 minutes until the jug is half empty. If you arrive before 11 AM: Drink the other  half of the Golytely jug at 11 PM night before procedure. If you arrive after 11 AM: Drink the other half of the Golytely jug at 6 AM day of procedure. For additional instructions refer to your colonoscopy prep instructions. (Patient not taking: Reported on 11/1/2023), Disp: 4000 mL, Rfl: 0    Allergies:  No Known Allergies    SUBJECTIVE    HPI: Trevor Wallace is an 48 year old male who presents for evaluation and treatment of abdominal pain.    ROS:      Review of Systems   Constitutional: Negative.  Negative for chills and fever.   HENT: Negative.  Negative for sore throat.    Respiratory: Negative.  Negative for cough, chest tightness, shortness of breath and wheezing.    Cardiovascular: Negative.  Negative for chest pain and palpitations.   Gastrointestinal:  Positive for abdominal pain. Negative for diarrhea, nausea and vomiting.   Genitourinary:  Negative for dysuria, frequency, hematuria and urgency.   Musculoskeletal: Negative.  Negative for myalgias.   Skin:  Negative for rash.   Allergic/Immunologic: Negative.  Negative for immunocompromised state.   Neurological: Negative.  Negative for headaches.        Family History   Family History   Problem Relation Age of Onset    Hypertension Mother        Social History  Social History     Socioeconomic History    Marital status:      Spouse name: Not on file    Number of children: Not on file    Years of education: Not on file    Highest education level: Not on file   Occupational History    Not on file   Tobacco Use    Smoking status: Some Days     Types: Cigarettes    Smokeless tobacco: Never   Vaping Use    Vaping Use: Never used   Substance and Sexual Activity    Alcohol use: No    Drug use: No    Sexual activity: Never   Other Topics Concern    Parent/sibling w/ CABG, MI or angioplasty before 65F 55M? No   Social History Narrative    Not on file     Social Determinants of Health     Financial Resource Strain: Not on file   Food Insecurity: Not on file    Transportation Needs: Not on file   Physical Activity: Not on file   Stress: Not on file   Social Connections: Not on file   Interpersonal Safety: Not on file   Housing Stability: Not on file        Surgical History:  Past Surgical History:   Procedure Laterality Date    COMBINED ESOPHAGOSCOPY, GASTROSCOPY, DUODENOSCOPY (EGD) WITH CO2 INSUFFLATION N/A 3/15/2021    Procedure: ESOPHAGOGASTRODUODENOSCOPY, WITH CO2 INSUFFLATION;  Surgeon: Mathew Laguna MD;  Location: [facility] OR    ESOPHAGOSCOPY, GASTROSCOPY, DUODENOSCOPY (EGD), COMBINED N/A 3/15/2021    Procedure: Esophagogastroduodenoscopy, With Biopsy;  Surgeon: Mathew Laguna MD;  Location: [facility] OR        Problem List:  Patient Active Problem List   Diagnosis    Gastroesophageal reflux disease, esophagitis presence not specified    Hyperlipidemia LDL goal <100    Obesity (BMI 30.0-34.9)    Snoring    Tobacco use disorder    Type 2 diabetes mellitus with microalbuminuria, without long-term current use of insulin (H)           OBJECTIVE:     Vital signs noted and reviewed by Barrett Cruz PA-C  /78   Pulse 86   Temp 97.4  F (36.3  C) (Tympanic)   Resp 18   Wt 91.2 kg (201 lb)   SpO2 100%   BMI 33.04 kg/m       PEFR:    Physical Exam  Vitals and nursing note reviewed.   Constitutional:       General: He is not in acute distress.     Appearance: He is well-developed. He is not ill-appearing, toxic-appearing or diaphoretic.   HENT:      Head: Normocephalic and atraumatic.      Right Ear: Hearing, tympanic membrane, ear canal and external ear normal. Tympanic membrane is not perforated, erythematous, retracted or bulging.      Left Ear: Hearing, tympanic membrane, ear canal and external ear normal. Tympanic membrane is not perforated, erythematous, retracted or bulging.      Nose: Nose normal. No mucosal edema, congestion or rhinorrhea.      Mouth/Throat:      Pharynx: No oropharyngeal exudate or posterior oropharyngeal erythema.      Tonsils:  No tonsillar exudate or tonsillar abscesses. 0 on the right. 0 on the left.   Eyes:      Pupils: Pupils are equal, round, and reactive to light.   Cardiovascular:      Rate and Rhythm: Normal rate and regular rhythm.      Heart sounds: Normal heart sounds, S1 normal and S2 normal. Heart sounds not distant. No murmur heard.     No friction rub. No gallop.   Pulmonary:      Effort: Pulmonary effort is normal. No respiratory distress.      Breath sounds: Normal breath sounds. No decreased breath sounds, wheezing, rhonchi or rales.   Abdominal:      General: Bowel sounds are normal. There is no distension.      Palpations: Abdomen is soft.      Tenderness: There is no abdominal tenderness.   Musculoskeletal:      Cervical back: Normal range of motion and neck supple.   Lymphadenopathy:      Cervical: No cervical adenopathy.   Skin:     General: Skin is warm and dry.      Findings: No rash.   Neurological:      Mental Status: He is alert.      Cranial Nerves: No cranial nerve deficit.   Psychiatric:         Attention and Perception: He is attentive.         Speech: Speech normal.         Behavior: Behavior normal. Behavior is cooperative.         Thought Content: Thought content normal.         Judgment: Judgment normal.             Barrett Cruz PA-C  11/1/2023, 4:50 PM

## 2024-01-16 ENCOUNTER — OFFICE VISIT (OUTPATIENT)
Dept: URGENT CARE | Facility: URGENT CARE | Age: 49
End: 2024-01-16
Payer: COMMERCIAL

## 2024-01-16 ENCOUNTER — ANCILLARY PROCEDURE (OUTPATIENT)
Dept: GENERAL RADIOLOGY | Facility: CLINIC | Age: 49
End: 2024-01-16
Attending: PHYSICIAN ASSISTANT
Payer: COMMERCIAL

## 2024-01-16 VITALS
RESPIRATION RATE: 28 BRPM | BODY MASS INDEX: 33.53 KG/M2 | TEMPERATURE: 102.9 F | HEART RATE: 121 BPM | OXYGEN SATURATION: 97 % | SYSTOLIC BLOOD PRESSURE: 137 MMHG | DIASTOLIC BLOOD PRESSURE: 89 MMHG | WEIGHT: 204 LBS

## 2024-01-16 DIAGNOSIS — R50.9 FEVER, UNSPECIFIED: ICD-10-CM

## 2024-01-16 DIAGNOSIS — J22 LRTI (LOWER RESPIRATORY TRACT INFECTION): Primary | ICD-10-CM

## 2024-01-16 LAB
FLUAV AG SPEC QL IA: NEGATIVE
FLUBV AG SPEC QL IA: NEGATIVE

## 2024-01-16 PROCEDURE — 99214 OFFICE O/P EST MOD 30 MIN: CPT | Performed by: PHYSICIAN ASSISTANT

## 2024-01-16 PROCEDURE — 87804 INFLUENZA ASSAY W/OPTIC: CPT | Performed by: PHYSICIAN ASSISTANT

## 2024-01-16 PROCEDURE — 87635 SARS-COV-2 COVID-19 AMP PRB: CPT | Performed by: PHYSICIAN ASSISTANT

## 2024-01-16 PROCEDURE — 71046 X-RAY EXAM CHEST 2 VIEWS: CPT | Mod: TC | Performed by: STUDENT IN AN ORGANIZED HEALTH CARE EDUCATION/TRAINING PROGRAM

## 2024-01-16 RX ORDER — DOXYCYCLINE 100 MG/1
100 CAPSULE ORAL 2 TIMES DAILY
Qty: 14 CAPSULE | Refills: 0 | Status: SHIPPED | OUTPATIENT
Start: 2024-01-16 | End: 2024-01-23

## 2024-01-16 RX ORDER — GUAIFENESIN 600 MG/1
TABLET, EXTENDED RELEASE ORAL
Qty: 30 TABLET | Refills: 0 | Status: SHIPPED | OUTPATIENT
Start: 2024-01-16 | End: 2024-08-26

## 2024-01-16 RX ORDER — BENZONATATE 100 MG/1
CAPSULE ORAL
Qty: 45 CAPSULE | Refills: 0 | Status: SHIPPED | OUTPATIENT
Start: 2024-01-16 | End: 2024-09-11

## 2024-01-16 NOTE — PROGRESS NOTES
Chief Complaint   Patient presents with    Urgent Care    URI     Per patient symptoms started since Friday evening, symptoms fever, chest congestion,runny nose, cough, body aches and chills. Patient did do a home covid test yesterday it was negative, has taken ibuprofen last dose was yesterday        ASSESSMENT/PLAN:  Trevor was seen today for urgent care and uri.    Diagnoses and all orders for this visit:    LRTI (lower respiratory tract infection)  -     doxycycline hyclate (VIBRAMYCIN) 100 MG capsule; Take 1 capsule (100 mg) by mouth 2 times daily for 7 days    Fever, unspecified  -     Influenza A & B Antigen - Clinic Collect  -     guaiFENesin (MUCINEX) 600 MG 12 hr tablet; Take 1-2 tablets as needed up to twice a day  -     benzonatate (TESSALON) 100 MG capsule; Take 1-2 capsules by mouth up to 3 x a day as needed with food  -     Symptomatic COVID-19 Virus (Coronavirus) by PCR Nose  -     XR Chest 2 Views; Future    Flu negative, COVID PCR pending.  X-ray shows some peribronchial cuffing more pronounced on the right side but no acute infiltrate or consolidation.  Abnormal breath sounds in the right side.  Concern for lower respiratory tract infection given length of symptoms, worsening in continued fever.  Start doxycycline, however stop if COVID test positive is that would be the explanation of his symptoms and then treat symptomatically as outlined in the AVS.  Return precautions discussed     Rubens Donis PA-C      SUBJECTIVE:  Trevor is a 48 year old male who presents to urgent care with 5 days of fever, cough, chest congestion, runny nose, chills and bodyaches.  Did a COVID test that was yesterday.  Ibuprofen helps with his symptoms.  Symptoms feeling they are getting worse.    ROS: Pertinent ROS neg other than the symptoms noted above in the HPI.     OBJECTIVE:  /89   Pulse (!) 121   Temp (!) 102.9  F (39.4  C) (Tympanic)   Resp 28   Wt 92.5 kg (204 lb)   SpO2 97%   BMI 33.53 kg/m      GENERAL: healthy, alert and no distress  EYES: Eyes grossly normal to inspection, PERRL and conjunctivae and sclerae normal  HENT: ear canals and TM's normal, nose and mouth without ulcers or lesions, rhinorrhea  RESP: Mild rhonchi right lower lobe intermittently  CV: Tachycardic, regular rhythm, normal S1 S2, no S3 or S4, no murmur, click or rub, no peripheral edema and peripheral pulses strong    DIAGNOSTICS    No results found for any visits on 01/16/24.     Current Outpatient Medications   Medication    aspirin (ASA) 81 MG EC tablet    atorvastatin (LIPITOR) 20 MG tablet    esomeprazole (NEXIUM) 20 MG DR capsule    lisinopril (ZESTRIL) 10 MG tablet    bisacodyl (DULCOLAX) 5 MG EC tablet    polyethylene glycol (GOLYTELY) 236 g suspension     No current facility-administered medications for this visit.      Patient Active Problem List   Diagnosis    Gastroesophageal reflux disease, esophagitis presence not specified    Hyperlipidemia LDL goal <100    Obesity (BMI 30.0-34.9)    Snoring    Tobacco use disorder    Type 2 diabetes mellitus with microalbuminuria, without long-term current use of insulin (H)      Past Medical History:   Diagnosis Date    Gastroesophageal reflux disease, esophagitis presence not specified 3/21/2018    Hyperlipidemia LDL goal <130 3/21/2018    Impaired fasting glucose 3/21/2018    Obesity (BMI 30.0-34.9) 3/21/2018    Smoking 4/20/2018    Snoring 4/20/2018     Past Surgical History:   Procedure Laterality Date    COMBINED ESOPHAGOSCOPY, GASTROSCOPY, DUODENOSCOPY (EGD) WITH CO2 INSUFFLATION N/A 3/15/2021    Procedure: ESOPHAGOGASTRODUODENOSCOPY, WITH CO2 INSUFFLATION;  Surgeon: Mathew Laguna MD;  Location:  OR    ESOPHAGOSCOPY, GASTROSCOPY, DUODENOSCOPY (EGD), COMBINED N/A 3/15/2021    Procedure: Esophagogastroduodenoscopy, With Biopsy;  Surgeon: Mathew Laguna MD;  Location:  OR     Family History   Problem Relation Age of Onset    Hypertension Mother      Social  History     Tobacco Use    Smoking status: Some Days     Types: Cigarettes    Smokeless tobacco: Never   Substance Use Topics    Alcohol use: No              The plan of care was discussed with the patient. They understand and agree with the course of treatment prescribed. A printed summary was given including instructions and medications.  The use of Dragon/[employer] dictation services may have been used to construct the content in this note; any grammatical or spelling errors are non-intentional. Please contact the author of this note directly if you are in need of any clarification.

## 2024-01-16 NOTE — PATIENT INSTRUCTIONS
If your COVID test is negative stop the doxycycline.  That means it is COVID causing her symptoms and it is viral cause and will improve over the next 1 to 2 weeks.    If the COVID test is negative continue taking doxycycline.  If you develop any significant shortness with chest pain go to the ER.      Some things that you can do to help with your symptoms include:    Pain, malaise and inflammation:  Ibuprofen and Tylenol for pain and inflammation.  I prefer ibuprofen  Ibuprofen 400-600 mg (2-3 of the 200 mg OTC tablets or 400-600 mg of the children's liquid) up to 4 times daily with food or milk  Tylenol 500-1000 mg every 8 hours as needed    For nasal congestion and drainage  Flonase/fluticasone nasal spray 2 sprays in each nostril once a day for 1 - 4 weeks.  This may take several days to become effective.  Consider saline nasal rinses  Vicks VapoRub  Humidified air or steam    Cough:  Mucinex or guaifenesin can help get mucus out of your body and help with cough.  Dextromethorphan is a cough suppressant that may be helpful  Tessalon Perles may be prescribed  Vicks VapoRub  Humidified air or steam  Teaspoon of honey    Ear fullness or pain:  Flonase as above  Ibuprofen as above  Otovent, which is a balloon you blow up with your nose and helps pop the ears and regulate the pressure can be bought off of [employer] and works quite well    Supplements that may also be helpful:  Cold snap  Zicam  Zinc  Warm beverages, teas    Be sure to eat nutrient dense foods with a good mixture of fats, carbohydrates and proteins.  Your body burns more calories while sick.

## 2024-01-17 LAB — SARS-COV-2 RNA RESP QL NAA+PROBE: NEGATIVE

## 2024-05-05 ENCOUNTER — HEALTH MAINTENANCE LETTER (OUTPATIENT)
Age: 49
End: 2024-05-05

## 2024-06-19 ENCOUNTER — OFFICE VISIT (OUTPATIENT)
Dept: URGENT CARE | Facility: URGENT CARE | Age: 49
End: 2024-06-19
Payer: COMMERCIAL

## 2024-06-19 ENCOUNTER — ANCILLARY PROCEDURE (OUTPATIENT)
Dept: GENERAL RADIOLOGY | Facility: CLINIC | Age: 49
End: 2024-06-19
Attending: STUDENT IN AN ORGANIZED HEALTH CARE EDUCATION/TRAINING PROGRAM
Payer: COMMERCIAL

## 2024-06-19 VITALS
SYSTOLIC BLOOD PRESSURE: 115 MMHG | RESPIRATION RATE: 17 BRPM | HEART RATE: 80 BPM | DIASTOLIC BLOOD PRESSURE: 81 MMHG | OXYGEN SATURATION: 99 % | TEMPERATURE: 97.1 F | WEIGHT: 204 LBS | BODY MASS INDEX: 33.53 KG/M2

## 2024-06-19 DIAGNOSIS — M25.562 ACUTE PAIN OF LEFT KNEE: ICD-10-CM
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
## 2024-06-19 DIAGNOSIS — M25.562 ACUTE PAIN OF LEFT KNEE: Primary | ICD-10-CM

## 2024-06-19 DIAGNOSIS — R05.1 ACUTE COUGH: ICD-10-CM

## 2024-06-19 PROCEDURE — 99214 OFFICE O/P EST MOD 30 MIN: CPT | Performed by: STUDENT IN AN ORGANIZED HEALTH CARE EDUCATION/TRAINING PROGRAM

## 2024-06-19 PROCEDURE — 73562 X-RAY EXAM OF KNEE 3: CPT | Mod: TC | Performed by: RADIOLOGY

## 2024-06-19 RX ORDER — BENZONATATE 200 MG/1
200 CAPSULE ORAL 3 TIMES DAILY PRN
Qty: 30 CAPSULE | Refills: 0 | Status: SHIPPED | OUTPATIENT
Start: 2024-06-19 | End: 2024-09-11

## 2024-06-19 RX ORDER — PREDNISONE 20 MG/1
40 TABLET ORAL DAILY
Qty: 10 TABLET | Refills: 0 | Status: SHIPPED | OUTPATIENT
Start: 2024-06-19 | End: 2024-06-24
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
## 2024-06-20 ENCOUNTER — OFFICE VISIT (OUTPATIENT)
Dept: URGENT CARE | Facility: URGENT CARE | Age: 49
End: 2024-06-20
Payer: COMMERCIAL

## 2024-06-20 VITALS
RESPIRATION RATE: 16 BRPM | OXYGEN SATURATION: 99 % | SYSTOLIC BLOOD PRESSURE: 125 MMHG | DIASTOLIC BLOOD PRESSURE: 89 MMHG | HEART RATE: 89 BPM | WEIGHT: 204 LBS | TEMPERATURE: 97.8 F | BODY MASS INDEX: 33.53 KG/M2

## 2024-06-20 DIAGNOSIS — Z20.822 EXPOSURE TO 2019 NOVEL CORONAVIRUS: Primary | ICD-10-CM

## 2024-06-20 PROCEDURE — 87635 SARS-COV-2 COVID-19 AMP PRB: CPT | Performed by: NURSE PRACTITIONER

## 2024-06-20 PROCEDURE — 99213 OFFICE O/P EST LOW 20 MIN: CPT | Performed by: NURSE PRACTITIONER

## 2024-06-20 NOTE — PROGRESS NOTES
Assessment & Plan      Diagnosis Comments   1. Exposure to 2019 novel coronavirus  Symptomatic COVID-19 Virus (Coronavirus) by PCR Nose pending       Discussed continuing isolation as he has been per CDC guidelines.  We discussed red flag symptoms that warrant emergent or urgent evaluation.    LAURY Loyola CNP  [facility]    Donato Murray is a 48 year old male who presents to clinic today for the following health issues:  Chief Complaint   Patient presents with    Urgent Care    Covid 19 Testing     Per patient wife tested positive to covid and also has similar symptoms          6/20/2024     4:26 PM   Additional Questions   Roomed by Savita   Accompanied by Son and Daugther     HPI      URI Adult    Onset of symptoms was 1 day(s) ago.  Course of illness is same.    Severity mild  Current and Associated symptoms: runny nose  Treatment measures tried include Tylenol/Ibuprofen, Fluids, and Rest.  Predisposing factors include ill contact: Family member .      Review of Systems  Constitutional, HEENT, cardiovascular, pulmonary, gi and gu systems are negative, except as otherwise noted.      Objective    /89   Pulse 89   Temp 97.8  F (36.6  C) (Tympanic)   Resp 16   Wt 92.5 kg (204 lb)   SpO2 99%   BMI 33.53 kg/m    Physical Exam   GENERAL: alert and no distress  NECK: no adenopathy, no asymmetry, masses, or scars  RESP: lungs clear to auscultation - no rales, rhonchi or wheezes  CV: regular rate and rhythm, normal S1 S2, no S3 or S4, no murmur, click or rub, no peripheral edema  ABDOMEN: soft, nontender, no hepatosplenomegaly, no masses and bowel sounds normal  MS: no gross musculoskeletal defects noted, no edema  SKIN: no suspicious lesions or rashes

## 2024-06-21 ENCOUNTER — TELEPHONE (OUTPATIENT)
Dept: NURSING | Facility: CLINIC | Age: 49
End: 2024-06-21

## 2024-06-21 LAB — SARS-COV-2 RNA RESP QL NAA+PROBE: POSITIVE

## 2024-06-21 NOTE — TELEPHONE ENCOUNTER
Patient classified as COVID treatment eligible by Epic high risk algorithm:  No    Coronavirus (COVID-19) Notification    Reason for call  Notify of POSITIVE COVID-19 lab result, assess symptoms,  review [facility] recommendations    Lab Result   Lab test for 2019-nCoV rRt-PCR or SARS-COV-2 PCR  Oropharyngeal AND/OR nasopharyngeal swabs were POSITIVE for 2019-nCoV RNA [OR] SARS-COV-2 RNA (COVID-19) RNA     We have been unable to reach patient by phone at this time to notify of their Positive COVID-19 result.    Left voicemail message requesting a call back to [phone] [facility] for results.        A Positive COVID-19 letter will be sent via [employer] or the mail.    Casandra Huitron

## 2024-08-26 ENCOUNTER — ANCILLARY PROCEDURE (OUTPATIENT)
Dept: GENERAL RADIOLOGY | Facility: CLINIC | Age: 49
End: 2024-08-26
Attending: PHYSICIAN ASSISTANT
Payer: COMMERCIAL

## 2024-08-26 ENCOUNTER — OFFICE VISIT (OUTPATIENT)
Dept: INTERNAL MEDICINE | Facility: CLINIC | Age: 49
End: 2024-08-26
Payer: COMMERCIAL

## 2024-08-26 VITALS
WEIGHT: 205.5 LBS | DIASTOLIC BLOOD PRESSURE: 85 MMHG | HEIGHT: 65 IN | TEMPERATURE: 97.9 F | OXYGEN SATURATION: 98 % | HEART RATE: 81 BPM | BODY MASS INDEX: 34.24 KG/M2 | SYSTOLIC BLOOD PRESSURE: 126 MMHG

## 2024-08-26 DIAGNOSIS — R05.9 COUGH, UNSPECIFIED TYPE: Primary | ICD-10-CM

## 2024-08-26 DIAGNOSIS — R05.9 COUGH, UNSPECIFIED TYPE: ICD-10-CM

## 2024-08-26 DIAGNOSIS — Z86.16 HISTORY OF COVID-19: ICD-10-CM

## 2024-08-26 DIAGNOSIS — R09.81 NASAL CONGESTION: ICD-10-CM

## 2024-08-26 PROCEDURE — 99213 OFFICE O/P EST LOW 20 MIN: CPT | Performed by: PHYSICIAN ASSISTANT

## 2024-08-26 PROCEDURE — 71046 X-RAY EXAM CHEST 2 VIEWS: CPT | Mod: TC | Performed by: RADIOLOGY

## 2024-08-26 RX ORDER — FLUTICASONE PROPIONATE 50 MCG
1 SPRAY, SUSPENSION (ML) NASAL DAILY
Qty: 16 G | Refills: 1 | Status: SHIPPED | OUTPATIENT
Start: 2024-08-26

## 2024-08-26 NOTE — PROGRESS NOTES
"  Assessment & Plan     Cough, unspecified type    - XR Chest 2 Views; Future    Nasal congestion    - fluticasone (FLONASE) 50 MCG/ACT nasal spray; Spray 1 spray into both nostrils daily.    History of COVID-19            Nicotine/Tobacco Cessation  He reports that he has been smoking cigarettes. He has never used smokeless tobacco.  Nicotine/Tobacco Cessation Plan  Self help information given to patient  Review with PCP       BMI  Estimated body mass index is 33.78 kg/m  as calculated from the following:    Height as of this encounter: 1.661 m (5' 5.4\").    Weight as of this encounter: 93.2 kg (205 lb 8 oz).   Weight management plan: Patient was referred to their PCP to discuss a diet and exercise plan.      Has appt next week with pcp     Donato   Trevor is a 49 year old, presenting for the following health issues:  Chronic Cough  Coughing up mucus   Worse in the morning and when trying to go to bed   Otc cough syrups but nothing has helped       History of Present Illness       Reason for visit:  Chronic cough   He is taking medications regularly.     RESPIRATORY SYMPTOMS    Duration: 3months since covid   Description  nasal congestion  Severity: moderate  Accompanying signs and symptoms: None  History (predisposing factors):  tobacco abuse  Precipitating or alleviating factors: None  Therapies tried and outcome:  rest and fluids                    Objective    /85   Pulse 81   Temp 97.9  F (36.6  C) (Temporal)   Ht 1.661 m (5' 5.4\")   Wt 93.2 kg (205 lb 8 oz)   SpO2 98%   BMI 33.78 kg/m    Body mass index is 33.78 kg/m .  Physical Exam   GENERAL: alert and no distress  RESP: lungs clear to auscultation - no rales, rhonchi or wheezes  CV: regular rates and rhythm and normal S1 S2, no S3 or S4  MS: no gross musculoskeletal defects noted, no edema  SKIN: no suspicious lesions or rashes    Narrative & Impression   CHEST TWO VIEWS  8/26/2024 11:40 AM      HISTORY:  Cough, unspecified type.   "   COMPARISON: 1/16/2024.                                                                      IMPRESSION: Negative chest. Lungs clear.     JACQUI BUCKLEY MD               Signed Electronically by: Terra Bran PA-C

## 2024-09-11 ENCOUNTER — OFFICE VISIT (OUTPATIENT)
Dept: FAMILY MEDICINE | Facility: CLINIC | Age: 49
End: 2024-09-11
Payer: COMMERCIAL

## 2024-09-11 VITALS
HEART RATE: 83 BPM | RESPIRATION RATE: 16 BRPM | BODY MASS INDEX: 32.62 KG/M2 | DIASTOLIC BLOOD PRESSURE: 69 MMHG | TEMPERATURE: 98.4 F | WEIGHT: 203 LBS | SYSTOLIC BLOOD PRESSURE: 103 MMHG | OXYGEN SATURATION: 100 % | HEIGHT: 66 IN

## 2024-09-11 DIAGNOSIS — R80.9 TYPE 2 DIABETES MELLITUS WITH MICROALBUMINURIA, WITHOUT LONG-TERM CURRENT USE OF INSULIN (H): ICD-10-CM

## 2024-09-11 DIAGNOSIS — E78.5 HYPERLIPIDEMIA LDL GOAL <100: ICD-10-CM

## 2024-09-11 DIAGNOSIS — Z23 NEED FOR PNEUMOCOCCAL VACCINATION: ICD-10-CM

## 2024-09-11 DIAGNOSIS — E11.29 TYPE 2 DIABETES MELLITUS WITH MICROALBUMINURIA, WITHOUT LONG-TERM CURRENT USE OF INSULIN (H): ICD-10-CM

## 2024-09-11 DIAGNOSIS — L91.8 CUTANEOUS SKIN TAGS: ICD-10-CM

## 2024-09-11 DIAGNOSIS — E66.811 OBESITY (BMI 30.0-34.9): ICD-10-CM

## 2024-09-11 DIAGNOSIS — Z12.11 SCREEN FOR COLON CANCER: ICD-10-CM

## 2024-09-11 DIAGNOSIS — Z00.00 ROUTINE GENERAL MEDICAL EXAMINATION AT A HEALTH CARE FACILITY: Primary | ICD-10-CM

## 2024-09-11 DIAGNOSIS — K21.9 GASTROESOPHAGEAL REFLUX DISEASE, UNSPECIFIED WHETHER ESOPHAGITIS PRESENT: Chronic | ICD-10-CM

## 2024-09-11 PROCEDURE — 99396 PREV VISIT EST AGE 40-64: CPT | Mod: 25 | Performed by: FAMILY MEDICINE

## 2024-09-11 PROCEDURE — 90677 PCV20 VACCINE IM: CPT | Performed by: FAMILY MEDICINE

## 2024-09-11 PROCEDURE — 99207 PR FOOT EXAM NO CHARGE: CPT | Mod: 25 | Performed by: FAMILY MEDICINE

## 2024-09-11 PROCEDURE — 99213 OFFICE O/P EST LOW 20 MIN: CPT | Mod: 25 | Performed by: FAMILY MEDICINE

## 2024-09-11 PROCEDURE — 90471 IMMUNIZATION ADMIN: CPT | Performed by: FAMILY MEDICINE

## 2024-09-11 RX ORDER — LISINOPRIL 10 MG/1
10 TABLET ORAL DAILY
Qty: 90 TABLET | Refills: 3 | Status: SHIPPED | OUTPATIENT
Start: 2024-09-11

## 2024-09-11 RX ORDER — ATORVASTATIN CALCIUM 20 MG/1
20 TABLET, FILM COATED ORAL DAILY
Qty: 90 TABLET | Refills: 3 | Status: SHIPPED | OUTPATIENT
Start: 2024-09-11

## 2024-09-11 ASSESSMENT — PAIN SCALES - GENERAL: PAINLEVEL: NO PAIN (0)
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
## 2024-09-11 NOTE — NURSING NOTE
Prior to immunization administration, verified patients identity using patient s name and date of birth. Please see Immunization Activity for additional information.     Screening Questionnaire for Adult Immunization    Are you sick today?   No   Do you have allergies to medications, food, a vaccine component or latex?   No   Have you ever had a serious reaction after receiving a vaccination?   No   Do you have a long-term health problem with heart, lung, kidney, or metabolic disease (e.g., diabetes), asthma, a blood disorder, no spleen, complement component deficiency, a cochlear implant, or a spinal fluid leak?  Are you on long-term aspirin therapy?   No   Do you have cancer, leukemia, HIV/AIDS, or any other immune system problem?   No   Do you have a parent, brother, or sister with an immune system problem?   No   In the past 3 months, have you taken medications that affect  your immune system, such as prednisone, other steroids, or anticancer drugs; drugs for the treatment of rheumatoid arthritis, Crohn s disease, or psoriasis; or have you had radiation treatments?   No   Have you had a seizure, or a brain or other nervous system problem?   No   During the past year, have you received a transfusion of blood or blood    products, or been given immune (gamma) globulin or antiviral drug?   No   For women: Are you pregnant or is there a chance you could become       pregnant during the next month?   No   Have you received any vaccinations in the past 4 weeks?   No     Immunization questionnaire answers were all negative.        Patient instructed to remain in clinic for 15 minutes afterwards, and to report any adverse reactions.     Screening performed by Akanksha Piña CMA on 9/11/2024 at 12:19 PM.

## 2024-10-02 ENCOUNTER — LAB (OUTPATIENT)
Dept: LAB | Facility: CLINIC | Age: 49
End: 2024-10-02
Payer: COMMERCIAL

## 2024-10-02 DIAGNOSIS — E11.29 TYPE 2 DIABETES MELLITUS WITH MICROALBUMINURIA, WITHOUT LONG-TERM CURRENT USE OF INSULIN (H): Primary | ICD-10-CM

## 2024-10-02 DIAGNOSIS — R80.9 TYPE 2 DIABETES MELLITUS WITH MICROALBUMINURIA, WITHOUT LONG-TERM CURRENT USE OF INSULIN (H): Primary | ICD-10-CM

## 2024-10-02 LAB
ANION GAP SERPL CALCULATED.3IONS-SCNC: 12 MMOL/L (ref 7–15)
BUN SERPL-MCNC: 8.9 MG/DL (ref 6–20)
CALCIUM SERPL-MCNC: 9.1 MG/DL (ref 8.8–10.4)
CHLORIDE SERPL-SCNC: 101 MMOL/L (ref 98–107)
CHOLEST SERPL-MCNC: 138 MG/DL
CREAT SERPL-MCNC: 0.81 MG/DL (ref 0.67–1.17)
CREAT UR-MCNC: 62.6 MG/DL
EGFRCR SERPLBLD CKD-EPI 2021: >90 ML/MIN/1.73M2
EST. AVERAGE GLUCOSE BLD GHB EST-MCNC: 163 MG/DL
FASTING STATUS PATIENT QL REPORTED: YES
FASTING STATUS PATIENT QL REPORTED: YES
GLUCOSE SERPL-MCNC: 132 MG/DL (ref 70–99)
HBA1C MFR BLD: 7.3 % (ref 0–5.6)
HCO3 SERPL-SCNC: 21 MMOL/L (ref 22–29)
HDLC SERPL-MCNC: 45 MG/DL
LDLC SERPL CALC-MCNC: 77 MG/DL
MICROALBUMIN UR-MCNC: <12 MG/L
MICROALBUMIN/CREAT UR: NORMAL MG/G{CREAT}
NONHDLC SERPL-MCNC: 93 MG/DL
POTASSIUM SERPL-SCNC: 4.4 MMOL/L (ref 3.4–5.3)
SODIUM SERPL-SCNC: 134 MMOL/L (ref 135–145)
TRIGL SERPL-MCNC: 81 MG/DL

## 2024-10-02 PROCEDURE — 36415 COLL VENOUS BLD VENIPUNCTURE: CPT

## 2024-10-02 PROCEDURE — 82043 UR ALBUMIN QUANTITATIVE: CPT

## 2024-10-02 PROCEDURE — 80061 LIPID PANEL: CPT

## 2024-10-02 PROCEDURE — 82570 ASSAY OF URINE CREATININE: CPT

## 2024-10-02 PROCEDURE — 83036 HEMOGLOBIN GLYCOSYLATED A1C: CPT

## 2024-10-02 PROCEDURE — 80048 BASIC METABOLIC PNL TOTAL CA: CPT

## 2024-10-03 NOTE — RESULT ENCOUNTER NOTE
Trevor,  Your laboratory tests show that your electrolytes, kidney tests, and lipid values remain nice and healthy.  Your blood sugars seem to be creeping up a bit, however, with a higher hemoglobin A1c of 7.3, so renewed efforts at healthy eating and regular exercise would be prudent.  Please continue your same medication.  I would advise another recheck in 1 year.    Jairo Clay MD

## 2024-10-07 ENCOUNTER — MYC MEDICAL ADVICE (OUTPATIENT)
Dept: FAMILY MEDICINE | Facility: CLINIC | Age: 49
End: 2024-10-07
Payer: COMMERCIAL

## 2024-10-16 ENCOUNTER — OFFICE VISIT (OUTPATIENT)
Dept: FAMILY MEDICINE | Facility: CLINIC | Age: 49
End: 2024-10-16
Payer: COMMERCIAL

## 2024-10-16 VITALS
HEART RATE: 92 BPM | OXYGEN SATURATION: 99 % | RESPIRATION RATE: 20 BRPM | BODY MASS INDEX: 32.78 KG/M2 | WEIGHT: 204 LBS | TEMPERATURE: 98.3 F | SYSTOLIC BLOOD PRESSURE: 94 MMHG | DIASTOLIC BLOOD PRESSURE: 66 MMHG | HEIGHT: 66 IN

## 2024-10-16 DIAGNOSIS — E11.29 TYPE 2 DIABETES MELLITUS WITH MICROALBUMINURIA, WITHOUT LONG-TERM CURRENT USE OF INSULIN (H): Chronic | ICD-10-CM

## 2024-10-16 DIAGNOSIS — Z86.16 HISTORY OF COVID-19: ICD-10-CM

## 2024-10-16 DIAGNOSIS — R05.3 CHRONIC COUGH: Primary | ICD-10-CM

## 2024-10-16 DIAGNOSIS — K21.9 GASTROESOPHAGEAL REFLUX DISEASE, UNSPECIFIED WHETHER ESOPHAGITIS PRESENT: Chronic | ICD-10-CM

## 2024-10-16 DIAGNOSIS — F17.200 TOBACCO USE DISORDER: ICD-10-CM

## 2024-10-16 DIAGNOSIS — R80.9 TYPE 2 DIABETES MELLITUS WITH MICROALBUMINURIA, WITHOUT LONG-TERM CURRENT USE OF INSULIN (H): Chronic | ICD-10-CM

## 2024-10-16 PROCEDURE — 99214 OFFICE O/P EST MOD 30 MIN: CPT | Performed by: FAMILY MEDICINE

## 2024-10-16 PROCEDURE — G2211 COMPLEX E/M VISIT ADD ON: HCPCS | Performed by: FAMILY MEDICINE

## 2024-10-16 RX ORDER — FLUTICASONE PROPIONATE AND SALMETEROL 250; 50 UG/1; UG/1
1 POWDER RESPIRATORY (INHALATION) EVERY 12 HOURS
Qty: 1 EACH | Refills: 1 | Status: SHIPPED | OUTPATIENT
Start: 2024-10-16

## 2024-10-16 RX ORDER — BENZONATATE 200 MG/1
200 CAPSULE ORAL 3 TIMES DAILY PRN
Qty: 30 CAPSULE | Refills: 1 | Status: SHIPPED | OUTPATIENT
Start: 2024-10-16 | End: 2024-11-08

## 2024-10-16 RX ORDER — BUPROPION HYDROCHLORIDE 150 MG/1
TABLET, FILM COATED, EXTENDED RELEASE ORAL
Qty: 60 TABLET | Refills: 2 | Status: SHIPPED | OUTPATIENT
Start: 2024-10-16

## 2024-10-16 ASSESSMENT — PAIN SCALES - GENERAL: PAINLEVEL: NO PAIN (0)

## 2024-10-16 ASSESSMENT — ENCOUNTER SYMPTOMS: COUGH: 1

## 2024-10-16 NOTE — PROGRESS NOTES
"  Assessment & Plan       ICD-10-CM    1. Chronic cough  R05.3 fluticasone-salmeterol (ADVAIR) 250-50 MCG/ACT inhaler     benzonatate (TESSALON) 200 MG capsule      2. History of COVID-19  Z86.16 fluticasone-salmeterol (ADVAIR) 250-50 MCG/ACT inhaler     benzonatate (TESSALON) 200 MG capsule      3. Tobacco use disorder  F17.200 buPROPion (ZYBAN) 150 MG 12 hr tablet      4. Gastroesophageal reflux disease, unspecified whether esophagitis present  K21.9       5. Type 2 diabetes mellitus with microalbuminuria, without long-term current use of insulin (H)  E11.29     R80.9         I suspect he is having some \"long COVID\" symptoms including ongoing airway inflammation related to his history of COVID infection and ongoing tobacco use  I recommended treating that with a combination steroid/LABA inhaler and I prescribed that for him  He could also use Tessalon Perles on an as-needed basis  Continue the treatment for reflux disease  I emphasized the importance of smoking cessation and we will try him on bupropion for that    If new, worsening or persistent symptoms over the next 3 to 4 weeks, the patient is to call or return for a recheck      The longitudinal plan of care for the diagnosis(es)/condition(s) as documented were addressed during this visit. Due to the added complexity in care, I will continue to support Trevor in the subsequent management and with ongoing continuity of care.        Subjective   Trevor is a 49 year old, presenting for the following health issues:  Cough (Chronic cough since 6/24, had covid and the cough never stopped.)        10/16/2024     4:42 PM   Additional Questions   Roomed by cam         10/16/2024     4:42 PM   Patient Reported Additional Medications   Patient reports taking the following new medications none     Cough    History of Present Illness       Reason for visit:  Chronic cough    He eats 4 or more servings of fruits and vegetables daily.He consumes 0 sweetened beverage(s) " daily.He exercises with enough effort to increase his heart rate 9 or less minutes per day.  He exercises with enough effort to increase his heart rate 5 days per week.   He is taking medications regularly.     He had numerous other members of his family contracted COVID infections in June of this year.  The patient had no specific treatment for the COVID infection.  Prior to that, he was not experiencing any unusual cough, but since his COVID infection he has had a persistent cough.  He was seen for a couple of months after his COVID infection in August and had a chest x-ray which is negative.  He is a smoker.  He smokes about 10 cigarettes/day.  He does not feel like he has had much chest congestion or any wheezing.  He was tried on a fluticasone nasal spray, but that did not help.  His cough is worse at night.  He does cough up occasional white thick phlegm, but no colored phlegm.  He feels like his reflux is under good control.  He is on esomeprazole for that.     He previously tried Chantix to help with smoking cessation, but it gave him bad dreams.    Patient Active Problem List   Diagnosis    Gastroesophageal reflux disease, esophagitis presence not specified    Hyperlipidemia LDL goal <100    Obesity (BMI 30.0-34.9)    Snoring    Tobacco use disorder    Type 2 diabetes mellitus with microalbuminuria, without long-term current use of insulin (H)     Current Outpatient Medications   Medication Sig Dispense Refill    aspirin (ASA) 81 MG EC tablet Take 1 tablet (81 mg) by mouth daily      atorvastatin (LIPITOR) 20 MG tablet Take 1 tablet (20 mg) by mouth daily. 90 tablet 3       1       2    esomeprazole (NEXIUM) 20 MG DR capsule Take 1 capsule (20 mg) by mouth every morning (before breakfast). 90 capsule 3    fluticasone (FLONASE) 50 MCG/ACT nasal spray Spray 1 spray into both nostrils daily. 16 g 1       1    lisinopril (ZESTRIL) 10 MG tablet Take 1 tablet (10 mg) by mouth daily. 90 tablet 3     No current  "facility-administered medications for this visit.           Review of Systems  Noncontributory except as above.      Objective    BP 94/66 (BP Location: Left arm, Patient Position: Sitting, Cuff Size: Adult Regular)   Pulse 92   Temp 98.3  F (36.8  C) (Temporal)   Resp 20   Ht 1.674 m (5' 5.9\")   Wt 92.5 kg (204 lb)   SpO2 99%   BMI 33.03 kg/m    Body mass index is 33.03 kg/m .  Physical Exam   GENERAL: alert and no distress  EYES: Eyes grossly normal to inspection, PERRL and conjunctivae and sclerae normal  HENT: ear canals and TM's normal, nose and mouth without ulcers or lesions  NECK: no adenopathy, no asymmetry, masses, or scars  RESP: lungs clear to auscultation - no rales, rhonchi or wheezes    Previous chart notes and chest x-ray results were reviewed.        Signed Electronically by: Jairo Clay MD    "

## 2024-10-17 DIAGNOSIS — Z86.16 HISTORY OF COVID-19: ICD-10-CM

## 2024-10-17 DIAGNOSIS — R05.3 CHRONIC COUGH: ICD-10-CM

## 2024-10-17 RX ORDER — FLUTICASONE PROPIONATE AND SALMETEROL 250; 50 UG/1; UG/1
1 POWDER RESPIRATORY (INHALATION) EVERY 12 HOURS
OUTPATIENT
Start: 2024-10-17

## 2024-11-08 ENCOUNTER — MYC REFILL (OUTPATIENT)
Dept: FAMILY MEDICINE | Facility: CLINIC | Age: 49
End: 2024-11-08
Payer: COMMERCIAL

## 2024-11-08 DIAGNOSIS — R05.3 CHRONIC COUGH: ICD-10-CM

## 2024-11-08 DIAGNOSIS — Z86.16 HISTORY OF COVID-19: ICD-10-CM

## 2024-11-11 RX ORDER — BENZONATATE 200 MG/1
200 CAPSULE ORAL 3 TIMES DAILY PRN
Qty: 30 CAPSULE | Refills: 1 | Status: SHIPPED | OUTPATIENT
Start: 2024-11-11

## 2024-11-11 RX ORDER — FLUTICASONE PROPIONATE AND SALMETEROL 250; 50 UG/1; UG/1
1 POWDER RESPIRATORY (INHALATION) EVERY 12 HOURS
Qty: 1 EACH | Refills: 1 | OUTPATIENT
Start: 2024-11-11

## 2024-11-19 ENCOUNTER — TELEPHONE (OUTPATIENT)
Dept: FAMILY MEDICINE | Facility: CLINIC | Age: 49
End: 2024-11-19
Payer: COMMERCIAL

## 2024-11-19 NOTE — TELEPHONE ENCOUNTER
Hx miscarraige and Hx TIA with abnormal MRI 6/10/10  - on therapeutic SQ lovenox 100 mg BID   Patient Quality Outreach    Patient is due for the following:   Diabetes -  Diabetic Follow-Up Visit    Action(s) Taken:   See below    Type of outreach:    Chart review performed, no outreach needed. and Patient instructed to return in 12 months per Dr. Clay.    Questions for provider review:    None           Akanksha Piña, AIDA  Chart routed to Closing encounter

## 2024-12-03 ENCOUNTER — OFFICE VISIT (OUTPATIENT)
Dept: PULMONOLOGY | Facility: CLINIC | Age: 49
End: 2024-12-03
Payer: COMMERCIAL

## 2024-12-03 DIAGNOSIS — R05.3 CHRONIC COUGH: ICD-10-CM

## 2024-12-03 DIAGNOSIS — R06.02 SOB (SHORTNESS OF BREATH): ICD-10-CM

## 2024-12-03 PROCEDURE — 94726 PLETHYSMOGRAPHY LUNG VOLUMES: CPT | Performed by: INTERNAL MEDICINE

## 2024-12-03 PROCEDURE — 94060 EVALUATION OF WHEEZING: CPT | Performed by: INTERNAL MEDICINE

## 2024-12-03 PROCEDURE — 94729 DIFFUSING CAPACITY: CPT | Performed by: INTERNAL MEDICINE

## 2024-12-04 ENCOUNTER — OFFICE VISIT (OUTPATIENT)
Dept: PULMONOLOGY | Facility: CLINIC | Age: 49
End: 2024-12-04
Payer: COMMERCIAL

## 2024-12-04 VITALS
SYSTOLIC BLOOD PRESSURE: 124 MMHG | DIASTOLIC BLOOD PRESSURE: 76 MMHG | WEIGHT: 204 LBS | BODY MASS INDEX: 32.78 KG/M2 | HEART RATE: 101 BPM | OXYGEN SATURATION: 99 % | HEIGHT: 66 IN

## 2024-12-04 DIAGNOSIS — R05.3 CHRONIC COUGH: ICD-10-CM

## 2024-12-04 DIAGNOSIS — R06.02 SOB (SHORTNESS OF BREATH): Primary | ICD-10-CM

## 2024-12-04 DIAGNOSIS — Z87.891 PERSONAL HISTORY OF TOBACCO USE: ICD-10-CM

## 2024-12-04 DIAGNOSIS — Z86.16 HISTORY OF COVID-19: ICD-10-CM

## 2024-12-04 LAB
DLCOUNC-%PRED-PRE: 72 %
DLCOUNC-PRE: 19.06 ML/MIN/MMHG
DLCOUNC-PRED: 26.32 ML/MIN/MMHG
ERV-%PRED-PRE: 61 %
ERV-PRE: 0.91 L
ERV-PRED: 1.47 L
EXPTIME-PRE: 8.06 SEC
FEF2575-%PRED-POST: 73 %
FEF2575-%PRED-PRE: 72 %
FEF2575-POST: 2.31 L/SEC
FEF2575-PRE: 2.28 L/SEC
FEF2575-PRED: 3.15 L/SEC
FEFMAX-%PRED-PRE: 68 %
FEFMAX-PRE: 6.2 L/SEC
FEFMAX-PRED: 9.02 L/SEC
FEV1-%PRED-PRE: 72 %
FEV1-PRE: 2.36 L
FEV1FEV6-PRE: 80 %
FEV1FEV6-PRED: 81 %
FEV1FVC-PRE: 80 %
FEV1FVC-PRED: 81 %
FEV1SVC-PRE: 74 %
FEV1SVC-PRED: 74 %
FIFMAX-PRE: 3.34 L/SEC
FRCPLETH-%PRED-PRE: 79 %
FRCPLETH-PRE: 2.6 L
FRCPLETH-PRED: 3.27 L
FVC-%PRED-PRE: 73 %
FVC-PRE: 2.95 L
FVC-PRED: 4.02 L
IC-%PRED-PRE: 78 %
IC-PRE: 2.29 L
IC-PRED: 2.94 L
RVPLETH-%PRED-PRE: 83 %
RVPLETH-PRE: 1.69 L
RVPLETH-PRED: 2.03 L
TLCPLETH-%PRED-PRE: 77 %
TLCPLETH-PRE: 4.89 L
TLCPLETH-PRED: 6.31 L
VA-%PRED-PRE: 74 %
VA-PRE: 4.28 L
VC-%PRED-PRE: 73 %
VC-PRE: 3.2 L
VC-PRED: 4.38 L

## 2024-12-04 PROCEDURE — 99204 OFFICE O/P NEW MOD 45 MIN: CPT | Performed by: NURSE PRACTITIONER

## 2024-12-04 RX ORDER — POLYETHYLENE GLYCOL 3350 17 G
2 POWDER IN PACKET (EA) ORAL
Qty: 27 LOZENGE | Refills: 11 | Status: SHIPPED | OUTPATIENT
Start: 2024-12-04

## 2024-12-04 RX ORDER — NICOTINE 21 MG/24HR
1 PATCH, TRANSDERMAL 24 HOURS TRANSDERMAL EVERY 24 HOURS
Qty: 28 PATCH | Refills: 11 | Status: SHIPPED | OUTPATIENT
Start: 2024-12-04

## 2024-12-04 RX ORDER — BUDESONIDE AND FORMOTEROL FUMARATE DIHYDRATE 160; 4.5 UG/1; UG/1
2 AEROSOL RESPIRATORY (INHALATION) 2 TIMES DAILY
Qty: 10.2 G | Refills: 11 | Status: SHIPPED | OUTPATIENT
Start: 2024-12-04

## 2024-12-04 NOTE — PATIENT INSTRUCTIONS
It was a pleasure seeing you in clinic today. This is what we discussed:    START the Symbicort inhaler. Use this twice daily no matter what until we meet again.   Continue trying to quit smoking. Try the patches and lozenges with the Zyban.   We will get a CT scan of your chest to check on you lung tissue and airways.   I have referred you to cardiology to talk about shortness of breath.   Follow up 6 weeks   If you have worsening symptoms, questions, or need to speak with the nurse please call [phone].

## 2024-12-04 NOTE — PROGRESS NOTES
Patient oxygen saturation on RA at rest is 99% pulse 101  Oxygen saturation after ambulating 400ft on RA is 99% pulse 112      Patient is ambulatory within his/her home.      Terra Skinner LPN

## 2024-12-04 NOTE — PROGRESS NOTES
Pulmonary Outpatient Consult Note  December 4, 2024      Assessment and Plan:   Trevor Wallace is a 49 year old M, current smoker, with history of GERD, DM2, and HLD presenting today for evaluation of dyspnea and cough.   Had COVID in June. Has noticed persistent cough, nasal congestion, and dyspnea. Also reports some intermittent shoulder and chest/epigastric cramping and pain. Had a similar prolonged illness recovery when he was a teen. Has tried BID Advair with minimal change. Has noticed some improvement with Zyrtec use.   Hx of GERD, is on Nexium.   PFTs show a decreased FVC, FEV1, and TLC suggesting restriction.  Flow-volume loops appear normal. There is a normal ratio with no significant bronchodilator response. Diffusing capacity when not corrected for hemoglobin is mildly reduced.    Lung exam today demonstrates decreased air movement in bilateral bases. SpO2 on room air is normal.     #. Dyspnea, cough: Onset following illness suggests post infectious process or long haul COVID. He is agreeable to trial another ICS/LABA. Since he has a lot of nasal congestion and there was some improvement after Zyrtec use cough may be secondary to PND. Normal ratio on PFTs and lack of BD response does not support an underlying asthma process. Smoking could be contributing. He is motivated to quit.   STOP Advair.   START Symbicort 2 puffs BID. Rinse and gargle after use. Use daily until follow up.   NRT and Zyban. Encouraged cessation.   Continue saline nasal rinses and Flonase nasal spray. If continued congestion will refer to ENT.   #. Restriction on PFTs, diffusion defect: Could explain dyspnea. Concern for scarring or fibrosis post COVID. Will obtain imaging. Differential includes, upper airway/vocal cord dysfunction, body habitus, or ILD. Cannot rule out cardiac etiology given intermittent chest discomfort. He has a family history of presumed cardiomyopathy and is interested in a cardiology referral.   Hi-res CT  chest. We will go over results at follow up.   #. HCM: UTD with COVID vaccine (02/2023), TDAP, PCV 20, and PPSV 23    RTC 6 weeks for recheck     Sho Gee CNP  Pulmonary Medicine  ______________________________________________________________________________    CC:   Chief Complaint   Patient presents with    Consult     Cough and shortness of breath x 6 months      HPI:   Trevor presents today for evaluation of dyspnea. No hx of asthma.   Since having COVID in June has noticed persistent dyspnea and cough.   The cough is worse at night. Will have coughing fits that last a few minutes. Usually productive of yellow/clear mucous.   Will also have chest tightness and wheeze.   Dyspnea is worse with activity, will become winded after 2 flights of stairs. Does not exercise regularly.   Admits to frequent throat clearing and nasal congestion. Has had this on and off depending on weather for years. Denies seasonal allergies.   Has started Zyrtec in the last few days with some relief.   Remembers a similar prolonged recovery period when he was a teen that lasted for 3 months.   Has [place] lived in [place] the [place]. Previously lived in [facility] [place] moved in [place].     FH- no asthma or other lung disease. Mother and father had cardiomyopathy.      PMH:  Patient Active Problem List    Diagnosis Date Noted    Type 2 diabetes mellitus with microalbuminuria, without long-term current use of insulin (H) 04/19/2021     Priority: Medium    Snoring 04/20/2018     Priority: Medium    Tobacco use disorder 04/20/2018     Priority: Medium    Gastroesophageal reflux disease, esophagitis presence not specified 03/21/2018     Priority: Medium     IMO Regulatory Load OCT 2020      Hyperlipidemia LDL goal <100 03/21/2018     Priority: Medium    Obesity (BMI 30.0-34.9) 03/21/2018     Priority: Medium     PSH:  Past Surgical History:   Procedure Laterality Date    COMBINED ESOPHAGOSCOPY, GASTROSCOPY, DUODENOSCOPY (EGD) WITH CO2 INSUFFLATION  "N/A 3/15/2021    Procedure: ESOPHAGOGASTRODUODENOSCOPY, WITH CO2 INSUFFLATION;  Surgeon: Mathew Laguna MD;  Location: [facility] OR    ESOPHAGOSCOPY, GASTROSCOPY, DUODENOSCOPY (EGD), COMBINED N/A 3/15/2021    Procedure: Esophagogastroduodenoscopy, With Biopsy;  Surgeon: Mathew Laguna MD;  Location: [facility] OR     Current Meds:  Current Outpatient Medications   Medication Sig Dispense Refill    aspirin (ASA) 81 MG EC tablet Take 1 tablet (81 mg) by mouth daily      atorvastatin (LIPITOR) 20 MG tablet Take 1 tablet (20 mg) by mouth daily. 90 tablet 3    benzonatate (TESSALON) 200 MG capsule Take 1 capsule (200 mg) by mouth 3 times daily as needed for cough. 30 capsule 1    buPROPion (ZYBAN) 150 MG 12 hr tablet One pill each morning for 3 days, then 1 pill twice daily thereafter 60 tablet 2    esomeprazole (NEXIUM) 20 MG DR capsule Take 1 capsule (20 mg) by mouth every morning (before breakfast). 90 capsule 3    fluticasone (FLONASE) 50 MCG/ACT nasal spray Spray 1 spray into both nostrils daily. 16 g 1    fluticasone-salmeterol (ADVAIR) 250-50 MCG/ACT inhaler Inhale 1 puff into the lungs every 12 hours. 1 each 1    lisinopril (ZESTRIL) 10 MG tablet Take 1 tablet (10 mg) by mouth daily. 90 tablet 3     No current facility-administered medications for this visit.     Allergies:  No Known Allergies    Social Hx:  Marital status: lives with wife and children   Number of children: 2   Resides in a house, no concern for mold. Previous radon test normal.   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    service: none   Pets: none  Smoking history: 8 pack year history    Family HX: family history includes Hypertension in his mother.    ROS:   10-point review performed and notable for the above mentioned symptoms. The remainder reviewed and negative.     Physical Exam:  /76   Pulse 101   Ht 1.674 m (5' 5.9\")   Wt " 92.5 kg (204 lb)   SpO2 99%   BMI 33.03 kg/m      Physical Exam  Constitutional:       General: He is not in acute distress.     Appearance: He is obese. He is not ill-appearing.   Cardiovascular:      Rate and Rhythm: Normal rate and regular rhythm.      Heart sounds: Normal heart sounds.   Pulmonary:      Effort: Pulmonary effort is normal. No respiratory distress.      Breath sounds: No wheezing or rhonchi.   Neurological:      Mental Status: He is alert.   Psychiatric:         Behavior: Behavior normal.         PFT's     12/3/2024:      Impression:  Full Pulmonary Function Test is abnormal.  PFTs are consistent with  no  obstructive disease.  Spirometry is not consistent with reversibility.  There is no hyperinflation.  There is no air-trapping.  FVC, ,FEV1, and TLC are reduced suggesting restriction.   Diffusion capacity when not corrected for hemoglobin is mildly reduced.    Labs:   personally reviewed in the EMR.    Imaging studies: personally reviewed and interpreted. Below are the Radiology interpretations.    CHEST TWO VIEWS  8/26/2024 11:40 AM   HISTORY:  Cough, unspecified type.  COMPARISON: 1/16/2024.                                                                 IMPRESSION: Negative chest. Lungs clear.

## 2024-12-23 ENCOUNTER — OFFICE VISIT (OUTPATIENT)
Dept: FAMILY MEDICINE | Facility: CLINIC | Age: 49
End: 2024-12-23
Payer: MEDICAID

## 2024-12-23 VITALS
TEMPERATURE: 96.9 F | HEART RATE: 101 BPM | SYSTOLIC BLOOD PRESSURE: 108 MMHG | BODY MASS INDEX: 31.69 KG/M2 | HEIGHT: 66 IN | OXYGEN SATURATION: 99 % | RESPIRATION RATE: 20 BRPM | WEIGHT: 197.2 LBS | DIASTOLIC BLOOD PRESSURE: 74 MMHG

## 2024-12-23 DIAGNOSIS — J44.9 CHRONIC OBSTRUCTIVE PULMONARY DISEASE, UNSPECIFIED COPD TYPE (H): ICD-10-CM

## 2024-12-23 DIAGNOSIS — R80.9 TYPE 2 DIABETES MELLITUS WITH MICROALBUMINURIA, WITHOUT LONG-TERM CURRENT USE OF INSULIN (H): ICD-10-CM

## 2024-12-23 DIAGNOSIS — E11.29 TYPE 2 DIABETES MELLITUS WITH MICROALBUMINURIA, WITHOUT LONG-TERM CURRENT USE OF INSULIN (H): ICD-10-CM

## 2024-12-23 DIAGNOSIS — E66.811 OBESITY (BMI 30.0-34.9): ICD-10-CM

## 2024-12-23 DIAGNOSIS — J18.9 PNEUMONIA OF LEFT LOWER LOBE DUE TO INFECTIOUS ORGANISM: Primary | ICD-10-CM

## 2024-12-23 PROBLEM — F17.200 TOBACCO USE DISORDER: Status: RESOLVED | Noted: 2018-04-20 | Resolved: 2024-12-23

## 2024-12-23 PROCEDURE — 99214 OFFICE O/P EST MOD 30 MIN: CPT | Performed by: FAMILY MEDICINE

## 2024-12-23 RX ORDER — ACETAMINOPHEN 500 MG
1000 TABLET ORAL
COMMUNITY
Start: 2024-12-18

## 2024-12-23 RX ORDER — OXYCODONE HYDROCHLORIDE 5 MG/1
5 TABLET ORAL
COMMUNITY
Start: 2024-12-18

## 2024-12-23 NOTE — PROGRESS NOTES
Assessment & Plan       ICD-10-CM    1. Pneumonia of left lower lobe due to infectious organism  J18.9 Adult Pulmonary Medicine  Referral      2. Chronic obstructive pulmonary disease, unspecified COPD type (H)  J44.9 Adult Pulmonary Medicine  Referral      3. Obesity (BMI 30.0-34.9)  E66.811       4. Type 2 diabetes mellitus with microalbuminuria, without long-term current use of insulin (H)  E11.29     R80.9         Vital signs look in good shape today  We discussed the above items  He will continue on the Augmentin antibiotic and follow-up with infectious disease in early January as scheduled  He will plan to have a repeat chest x-ray with him  He will plan to follow-up with pulmonary medicine again in a few weeks or so, but he would prefer a different provider, so a new referral was placed for that  We discussed working on diet and exercise treatment for weight loss and for blood sugar control and he is welcome to reach out for a Diabetic Ed referral at some point if desired  He was strongly encouraged to remain a non-smoker  Plan a tentative recheck in about 3 months        MED REC REQUIRED  Post Medication Reconciliation Status: discharge medications reconciled, continue medications without change        Subjective   Trevor is a 49 year old, presenting for the following health issues:  Hospital F/U ([facility])      12/23/2024    11:09 AM   Additional Questions   Roomed by loretta         12/23/2024   Forms   Any forms needing to be completed Yes     HPI       Hospital Follow-up Visit:    Hospital/Nursing Home/IP Rehab Facility:  [facility]  Date of Admission: 12/10/24  Date of Discharge: 12/18/24  Reason(s) for Admission: Pneumonia of left lower lobe due to infectious organism (Primary Dx);   Pulmonary nodules;   Sepsis, due to unspecified organism, unspecified whether acute organ dysfunction present (HC);   Multiple pulmonary nodules;   Current smoker;   Type 2 diabetes mellitus without  complication, without long-term current use of insulin (HC);   Parapneumonic effusion;   Pleural effusion, left  Was the patient in the ICU or did the patient experience delirium during hospitalization?  No  Do you have any other stressors you would like to discuss with your provider? No    Problems taking medications regularly:  None  Medication changes since discharge: antibiotic  Problems adhering to non-medication therapy:  None    Summary of hospitalization:  CareEverywhere information obtained and reviewed  Diagnostic Tests/Treatments reviewed.  Follow up needed: none  Other Healthcare Providers Involved in Patient s Care:          Infectious disease in early January and pulmonary medicine in mid January  Update since discharge: improved.         Plan of care communicated with patient and wife.             He went to the hospital on December 10 with shortness of breath and a cough and left upper chest pain.  He was found to have pneumonia with an effusion.  He had a chest tube placed and was hospitalized for over a week.  His chest tube was pulled 5 days ago and he was discharged to continue with the 21-day course of Augmentin.  He is planning to see infectious disease in early January and will have a repeat chest x-ray then.  He had seen a pulmonary provider in the week prior to his hospitalization and is scheduled to see her again in mid January, but would prefer to see someone else.  He was found to have at least mild COPD during his evaluation in recent weeks.  He is not on any inhaler currently.  He does get some shortness of breath with exertion currently, but his breathing and cough and chest discomfort are significantly improved now than previously.  He did quit smoking about 15 days ago and feels good about that.  He has been a smoker for about 25 years.    Patient Active Problem List   Diagnosis    Gastroesophageal reflux disease, esophagitis presence not specified    Hyperlipidemia LDL goal <100     "Obesity (BMI 30.0-34.9)    Snoring    Type 2 diabetes mellitus with microalbuminuria, without long-term current use of insulin (H)    Chronic obstructive pulmonary disease, unspecified COPD type (H)     Current Outpatient Medications   Medication Sig Dispense Refill    acetaminophen (TYLENOL) 500 MG tablet Take 1,000 mg by mouth.      amoxicillin-clavulanate (AUGMENTIN) 875-125 MG tablet Take 1 tablet by mouth.      aspirin (ASA) 81 MG EC tablet Take 1 tablet (81 mg) by mouth daily      atorvastatin (LIPITOR) 20 MG tablet Take 1 tablet (20 mg) by mouth daily. 90 tablet 3    budesonide-formoterol (SYMBICORT) 160-4.5 MCG/ACT Inhaler Inhale 2 puffs into the lungs 2 times daily. 10.2 g 11    esomeprazole (NEXIUM) 20 MG DR capsule Take 1 capsule (20 mg) by mouth every morning (before breakfast). 90 capsule 3    fluticasone (FLONASE) 50 MCG/ACT nasal spray Spray 1 spray into both nostrils daily. 16 g 1    lisinopril (ZESTRIL) 10 MG tablet Take 1 tablet (10 mg) by mouth daily. 90 tablet 3    nicotine (COMMIT) 2 MG lozenge Place 1 lozenge (2 mg) inside cheek every hour as needed for nicotine withdrawal symptoms. 27 lozenge 11    nicotine (NICODERM CQ) 14 MG/24HR 24 hr patch Place 1 patch over 24 hours onto the skin every 24 hours. 28 patch 11    oxyCODONE (ROXICODONE) 5 MG tablet Take 5 mg by mouth.      benzonatate (TESSALON) 200 MG capsule Take 1 capsule (200 mg) by mouth 3 times daily as needed for cough. (Patient not taking: Reported on 12/23/2024) 30 capsule 1    buPROPion (ZYBAN) 150 MG 12 hr tablet One pill each morning for 3 days, then 1 pill twice daily thereafter (Patient not taking: Reported on 12/23/2024) 60 tablet 2     No current facility-administered medications for this visit.           Review of Systems  Mainly significant for the above.      Objective    /74   Pulse 101   Temp 96.9  F (36.1  C) (Temporal)   Resp 20   Ht 1.674 m (5' 5.9\")   Wt 89.4 kg (197 lb 3.2 oz)   SpO2 99%   BMI 31.93 " kg/m    Body mass index is 31.93 kg/m .  Physical Exam   GENERAL: alert and no distress  RESP: Some mild decreased breath sounds in the left lung fields, otherwise clear  BACK: The dressing is in place over his chest tube drainage site    Hospital discharge summary and other hospital records were reviewed        Signed Electronically by: Jairo Clay MD

## 2025-04-05 ENCOUNTER — HEALTH MAINTENANCE LETTER (OUTPATIENT)
Age: 50
End: 2025-04-05

## 2025-06-18 ENCOUNTER — OFFICE VISIT (OUTPATIENT)
Dept: FAMILY MEDICINE | Facility: CLINIC | Age: 50
End: 2025-06-18
Payer: COMMERCIAL

## 2025-06-18 VITALS
SYSTOLIC BLOOD PRESSURE: 120 MMHG | DIASTOLIC BLOOD PRESSURE: 84 MMHG | BODY MASS INDEX: 34.23 KG/M2 | WEIGHT: 213 LBS | RESPIRATION RATE: 14 BRPM | HEIGHT: 66 IN | HEART RATE: 86 BPM | TEMPERATURE: 97.9 F | OXYGEN SATURATION: 100 %

## 2025-06-18 DIAGNOSIS — E11.29 TYPE 2 DIABETES MELLITUS WITH MICROALBUMINURIA, WITHOUT LONG-TERM CURRENT USE OF INSULIN (H): Primary | Chronic | ICD-10-CM

## 2025-06-18 DIAGNOSIS — J44.9 CHRONIC OBSTRUCTIVE PULMONARY DISEASE, UNSPECIFIED COPD TYPE (H): ICD-10-CM

## 2025-06-18 DIAGNOSIS — E66.811 OBESITY (BMI 30.0-34.9): ICD-10-CM

## 2025-06-18 DIAGNOSIS — R80.9 TYPE 2 DIABETES MELLITUS WITH MICROALBUMINURIA, WITHOUT LONG-TERM CURRENT USE OF INSULIN (H): Primary | Chronic | ICD-10-CM

## 2025-06-18 DIAGNOSIS — K21.9 GASTROESOPHAGEAL REFLUX DISEASE, UNSPECIFIED WHETHER ESOPHAGITIS PRESENT: Chronic | ICD-10-CM

## 2025-06-18 PROCEDURE — G2211 COMPLEX E/M VISIT ADD ON: HCPCS | Performed by: FAMILY MEDICINE

## 2025-06-18 PROCEDURE — 99214 OFFICE O/P EST MOD 30 MIN: CPT | Performed by: FAMILY MEDICINE

## 2025-06-18 ASSESSMENT — PATIENT HEALTH QUESTIONNAIRE - PHQ9
SUM OF ALL RESPONSES TO PHQ QUESTIONS 1-9: 12
SUM OF ALL RESPONSES TO PHQ QUESTIONS 1-9: 12
10. IF YOU CHECKED OFF ANY PROBLEMS, HOW DIFFICULT HAVE THESE PROBLEMS MADE IT FOR YOU TO DO YOUR WORK, TAKE CARE OF THINGS AT HOME, OR GET ALONG WITH OTHER PEOPLE: SOMEWHAT DIFFICULT

## 2025-06-18 ASSESSMENT — PAIN SCALES - GENERAL: PAINLEVEL_OUTOF10: NO PAIN (0)

## 2025-06-18 NOTE — PROGRESS NOTES
"  Assessment & Plan       ICD-10-CM    1. Type 2 diabetes mellitus with microalbuminuria, without long-term current use of insulin (H)  E11.29 semaglutide (OZEMPIC) 2 MG/3ML pen    R80.9       2. Gastroesophageal reflux disease, unspecified whether esophagitis present  K21.9 esomeprazole (NEXIUM) 20 MG DR capsule      3. Obesity (BMI 30.0-34.9)  E66.811 semaglutide (OZEMPIC) 2 MG/3ML pen      4. Chronic obstructive pulmonary disease, unspecified COPD type (H)  J44.9         I think a lot of his symptoms are weight related, including his in creased reflux symptoms and shortness of breath  His smoking cessation seem to contribute to the weight gain, which is not surprising  I encouraged him to remain a non-smoker  I think he is a good candidate for Ozempic for both his obesity and diabetes, so I prescribed that to him in the usual fashion to take 0.25 mg weekly for 1 month and then 0.5 mg weekly thereafter  I recommended increasing the esomeprazole to 20 mg twice a day before meals  Continue other same medications  I do not think his ACE inhibitor is contributing to his cough because the cough is not bothersome during the day, only at night when he lies down    Plan a recheck in 3 to 4 months with an annual physical and fasting lab work and follow-up on the above    The longitudinal plan of care for the diagnosis(es)/condition(s) as documented were addressed during this visit. Due to the added complexity in care, I will continue to support Trevor in the subsequent management and with ongoing continuity of care.      BMI  Estimated body mass index is 34.48 kg/m  as calculated from the following:    Height as of this encounter: 1.674 m (5' 5.9\").    Weight as of this encounter: 96.6 kg (213 lb).   Weight management plan: Discussed healthy diet and exercise guidelines and we will also start him on Ozempic    Depression Screening Follow Up        6/18/2025     3:42 PM   PHQ   PHQ-9 Total Score 12    Q9: Thoughts of " better off dead/self-harm past 2 weeks Not at all       Patient-reported         Subjective   Trevor is a 49 year old, presenting for the following health issues:  Gastrophageal Reflux, Weight Problem, and Breathing Problem      6/18/2025     3:52 PM   Additional Questions   Roomed by cam   Accompanied by none     History of Present Illness       Reason for visit:  I have multiple issues indigestion cough short of breath low energy and weight    He eats 0-1 servings of fruits and vegetables daily.He consumes 0 sweetened beverage(s) daily.He exercises with enough effort to increase his heart rate 9 or less minutes per day.  He exercises with enough effort to increase his heart rate 3 or less days per week.   He is taking medications regularly.        He quit smoking this past December.  He has gained about 15 pounds or so since then.  He has had some increased reflux symptoms and shortness of breath and low energy since then.  When he lies down at night, he often has an increased cough.  He is taking Nexium for GERD, but he still feels like he is getting some heartburn.  He had upper endoscopy a few years ago which apparently showed some esophagitis.  He does not have any significant cough during the day.  He has known some people that have taken Ozempic, including his nephew, and they have had good results with that and he wonders if he could try that.  He does have diet-controlled type 2 diabetes.  He will be due for annual physical and repeat lab work in October.  He remains on other medication as below.    Patient Active Problem List   Diagnosis    Gastroesophageal reflux disease, esophagitis presence not specified    Hyperlipidemia LDL goal <100    Obesity (BMI 30.0-34.9)    Snoring    Type 2 diabetes mellitus with microalbuminuria, without long-term current use of insulin (H)    Chronic obstructive pulmonary disease, unspecified COPD type (H)     Current Outpatient Medications   Medication Sig Dispense Refill  "   acetaminophen (TYLENOL) 500 MG tablet Take 1,000 mg by mouth.      aspirin (ASA) 81 MG EC tablet Take 1 tablet (81 mg) by mouth daily      atorvastatin (LIPITOR) 20 MG tablet Take 1 tablet (20 mg) by mouth daily. 90 tablet 3    budesonide-formoterol (SYMBICORT) 160-4.5 MCG/ACT Inhaler Inhale 2 puffs into the lungs 2 times daily. 10.2 g 11    esomeprazole (NEXIUM) 20 MG DR capsule Take 1 capsule (20 mg) by mouth every morning (before breakfast). 90 capsule 3    fluticasone (FLONASE) 50 MCG/ACT nasal spray Spray 1 spray into both nostrils daily. 16 g 1    lisinopril (ZESTRIL) 10 MG tablet Take 1 tablet (10 mg) by mouth daily. 90 tablet 3    nicotine (COMMIT) 2 MG lozenge Place 1 lozenge (2 mg) inside cheek every hour as needed for nicotine withdrawal symptoms. 27 lozenge 11     No current facility-administered medications for this visit.           Review of Systems  Mainly significant for the above.      Objective    /84   Pulse 86   Temp 97.9  F (36.6  C) (Temporal)   Resp 14   Ht 1.674 m (5' 5.9\")   Wt 96.6 kg (213 lb)   SpO2 100%   BMI 34.48 kg/m    Body mass index is 34.48 kg/m .  Physical Exam   GENERAL: alert and no distress  HENT: Grossly normal  RESP: lungs clear to auscultation - no rales, rhonchi or wheezes  CV: regular rate and rhythm, normal S1 S2, no S3 or S4, no murmur, click or rub  ABDOMEN: soft, nontender, no hepatosplenomegaly, no masses     Past lab values and chart notes were reviewed.        Signed Electronically by: Jairo Clay MD    "

## 2025-06-19 ENCOUNTER — TELEPHONE (OUTPATIENT)
Dept: FAMILY MEDICINE | Facility: CLINIC | Age: 50
End: 2025-06-19
Payer: COMMERCIAL

## 2025-06-19 NOTE — TELEPHONE ENCOUNTER
Prior Authorization Retail Medication Request    Medication/Dose: OZEMPIC 2 MG/ 3 ML PEN- INJECTOR  Diagnosis and ICD code (if different than what is on RX):    New/renewal/insurance change PA/secondary ins. PA:  Previously Tried and Failed:    Rationale:      Insurance   Primary: [employer] [place]   Insurance ID:  [id number]     Secondary (if applicable):  Insurance ID:      Pharmacy Information (if different than what is on RX)  Name:  [facility]  Phone:  [phone]  Fax:[phone]    GARCIA:[id number]    Clinic Information  Preferred routing pool for dept communication[facility]    Haleigh Moctezuma MA

## 2025-06-23 NOTE — TELEPHONE ENCOUNTER
Retail Pharmacy Prior Authorization Team   Phone: [phone]    PA Initiation    Medication: OZEMPIC (0.25 OR 0.5 MG/DOSE) 2 MG/3ML SC SOPN  Insurance Company: [employer] [place] - Phone [phone] Fax [phone]  Pharmacy Filling the Rx: [id number] - [place], [place] - [address]  Filling Pharmacy Phone: [phone]  Filling Pharmacy Fax:    Start Date: 6/23/2025    DANDRE BAUTISTA (Key: [id number])

## 2025-06-24 NOTE — TELEPHONE ENCOUNTER
Please advise the patient that the Ozempic is not covered for him with his current insurance plan.    Jairo Clay MD

## 2025-06-24 NOTE — TELEPHONE ENCOUNTER
PRIOR AUTHORIZATION DENIED    Medication: OZEMPIC (0.25 OR 0.5 MG/DOSE) 2 MG/3ML SC SOPN  Insurance Company: [employer] [place] - Phone [phone] Fax [phone]  Denial Date: 6/23/2025  Denial Reason(s): MUST TRY/FAIL METFORMIN OR INSULIN        Appeal Information: IF THE PROVIDER WOULD LIKE TO APPEAL THIS DECISION PLEASE PROVIDE THE PA TEAM WITH A LETTER OF MEDICAL NECESSITY      Patient Notified: NO

## 2025-06-25 ENCOUNTER — MYC MEDICAL ADVICE (OUTPATIENT)
Dept: FAMILY MEDICINE | Facility: CLINIC | Age: 50
End: 2025-06-25
Payer: COMMERCIAL

## 2025-06-25 NOTE — TELEPHONE ENCOUNTER
Attempt #1 to call patient.     RN left voicemail and requested return call to [facility] at [phone].     RIA Block  [place] Triage RN  [facility]

## 2025-06-25 NOTE — TELEPHONE ENCOUNTER
Attempt #2    MyChart message sent to patient.     Thanks,  FARRAH Jimenez  [facility]

## 2025-07-01 ENCOUNTER — MYC REFILL (OUTPATIENT)
Dept: INTERNAL MEDICINE | Facility: CLINIC | Age: 50
End: 2025-07-01
Payer: COMMERCIAL

## 2025-07-01 ENCOUNTER — MYC REFILL (OUTPATIENT)
Dept: FAMILY MEDICINE | Facility: CLINIC | Age: 50
End: 2025-07-01
Payer: COMMERCIAL

## 2025-07-01 DIAGNOSIS — R09.81 NASAL CONGESTION: ICD-10-CM

## 2025-07-01 DIAGNOSIS — R80.9 TYPE 2 DIABETES MELLITUS WITH MICROALBUMINURIA, WITHOUT LONG-TERM CURRENT USE OF INSULIN (H): ICD-10-CM

## 2025-07-01 DIAGNOSIS — E78.5 HYPERLIPIDEMIA LDL GOAL <100: ICD-10-CM

## 2025-07-01 DIAGNOSIS — E11.29 TYPE 2 DIABETES MELLITUS WITH MICROALBUMINURIA, WITHOUT LONG-TERM CURRENT USE OF INSULIN (H): ICD-10-CM

## 2025-07-01 NOTE — TELEPHONE ENCOUNTER
Attempt #3    Team,    Please mail letter stating the ozempic prior auth was denied my insurance and he needs to connect with insurance to determine which medication are covered. Ok close encounter when done.    Thanks,  FARRAH Jimenez  [facility]

## 2025-07-02 ENCOUNTER — MYC REFILL (OUTPATIENT)
Dept: FAMILY MEDICINE | Facility: CLINIC | Age: 50
End: 2025-07-02
Payer: COMMERCIAL

## 2025-07-02 DIAGNOSIS — R80.9 TYPE 2 DIABETES MELLITUS WITH MICROALBUMINURIA, WITHOUT LONG-TERM CURRENT USE OF INSULIN (H): Chronic | ICD-10-CM

## 2025-07-02 DIAGNOSIS — E11.29 TYPE 2 DIABETES MELLITUS WITH MICROALBUMINURIA, WITHOUT LONG-TERM CURRENT USE OF INSULIN (H): Chronic | ICD-10-CM

## 2025-07-02 DIAGNOSIS — E66.811 OBESITY (BMI 30.0-34.9): ICD-10-CM

## 2025-07-02 RX ORDER — LISINOPRIL 10 MG/1
10 TABLET ORAL DAILY
Qty: 90 TABLET | Refills: 3 | OUTPATIENT
Start: 2025-07-02

## 2025-07-02 RX ORDER — ATORVASTATIN CALCIUM 20 MG/1
20 TABLET, FILM COATED ORAL DAILY
Qty: 90 TABLET | Refills: 3 | OUTPATIENT
Start: 2025-07-02

## 2025-07-02 RX ORDER — FLUTICASONE PROPIONATE 50 MCG
1 SPRAY, SUSPENSION (ML) NASAL DAILY
Qty: 16 G | Refills: 1 | Status: SHIPPED | OUTPATIENT
Start: 2025-07-02

## 2025-07-02 RX ORDER — ASPIRIN 81 MG/1
81 TABLET, COATED ORAL DAILY
Qty: 90 TABLET | Refills: 0 | Status: SHIPPED | OUTPATIENT
Start: 2025-07-02

## 2025-07-06 NOTE — PATIENT INSTRUCTIONS
Preventive Health Recommendations  Male Ages 40 to 49    Yearly exam:             See your health care provider every year in order to  o   Review health changes.   o   Discuss preventive care.    o   Review your medicines if your doctor has prescribed any.    You should be tested each year for STDs (sexually transmitted diseases) if you re at risk.     Have a cholesterol test every 5 years.     Have a colonoscopy (test for colon cancer) if someone in your family has had colon cancer or polyps before age 50.     After age 45, have a diabetes test (fasting glucose). If you are at risk for diabetes, you should have this test every 3 years.      Talk with your health care provider about whether or not a prostate cancer screening test (PSA) is right for you.    Shots: Get a flu shot each year. Get a tetanus shot every 10 years.     Nutrition:    Eat at least 5 servings of fruits and vegetables daily.     Eat whole-grain bread, whole-wheat pasta and brown rice instead of white grains and rice.     Get adequate Calcium and Vitamin D.     Lifestyle    Exercise for at least 150 minutes a week (30 minutes a day, 5 days a week). This will help you control your weight and prevent disease.     Limit alcohol to one drink per day.     No smoking.     Wear sunscreen to prevent skin cancer.     See your dentist every six months for an exam and cleaning.       Abdominal Pain, N/V/D

## 2025-07-09 ENCOUNTER — OFFICE VISIT (OUTPATIENT)
Dept: FAMILY MEDICINE | Facility: CLINIC | Age: 50
End: 2025-07-09
Payer: COMMERCIAL

## 2025-07-09 VITALS
RESPIRATION RATE: 14 BRPM | WEIGHT: 215 LBS | HEIGHT: 66 IN | TEMPERATURE: 98 F | SYSTOLIC BLOOD PRESSURE: 131 MMHG | OXYGEN SATURATION: 100 % | HEART RATE: 89 BPM | DIASTOLIC BLOOD PRESSURE: 81 MMHG | BODY MASS INDEX: 34.55 KG/M2

## 2025-07-09 DIAGNOSIS — J44.9 CHRONIC OBSTRUCTIVE PULMONARY DISEASE, UNSPECIFIED COPD TYPE (H): ICD-10-CM

## 2025-07-09 DIAGNOSIS — K21.00 GASTROESOPHAGEAL REFLUX DISEASE WITH ESOPHAGITIS WITHOUT HEMORRHAGE: Chronic | ICD-10-CM

## 2025-07-09 DIAGNOSIS — R05.3 CHRONIC COUGH: Primary | ICD-10-CM

## 2025-07-09 DIAGNOSIS — R80.9 TYPE 2 DIABETES MELLITUS WITH MICROALBUMINURIA, WITHOUT LONG-TERM CURRENT USE OF INSULIN (H): ICD-10-CM

## 2025-07-09 DIAGNOSIS — J30.9 ALLERGIC RHINITIS, UNSPECIFIED SEASONALITY, UNSPECIFIED TRIGGER: ICD-10-CM

## 2025-07-09 DIAGNOSIS — E11.29 TYPE 2 DIABETES MELLITUS WITH MICROALBUMINURIA, WITHOUT LONG-TERM CURRENT USE OF INSULIN (H): ICD-10-CM

## 2025-07-09 PROCEDURE — 99214 OFFICE O/P EST MOD 30 MIN: CPT | Performed by: FAMILY MEDICINE

## 2025-07-09 PROCEDURE — G2211 COMPLEX E/M VISIT ADD ON: HCPCS | Performed by: FAMILY MEDICINE

## 2025-07-09 RX ORDER — CETIRIZINE HYDROCHLORIDE 10 MG/1
10 TABLET ORAL DAILY
Qty: 30 TABLET | Refills: 11 | Status: SHIPPED | OUTPATIENT
Start: 2025-07-09

## 2025-07-09 ASSESSMENT — PAIN SCALES - GENERAL: PAINLEVEL_OUTOF10: NO PAIN (0)

## 2025-07-09 NOTE — PROGRESS NOTES
Assessment & Plan       ICD-10-CM    1. Chronic cough  R05.3 cetirizine (ZYRTEC) 10 MG tablet      2. Allergic rhinitis, unspecified seasonality, unspecified trigger  J30.9 cetirizine (ZYRTEC) 10 MG tablet      3. Chronic obstructive pulmonary disease, unspecified COPD type (H)  J44.9       4. Gastroesophageal reflux disease with esophagitis without hemorrhage  K21.00       5. Type 2 diabetes mellitus with microalbuminuria, without long-term current use of insulin (H)  E11.29     R80.9         I suspect his current cough and symptoms are primarily due to allergies, so I suggested trying cetirizine 10 mg daily in addition to the fluticasone nasal spray  He does certainly have other potential causes of a persistent cough including his GERD, use of lisinopril, underlying COPD, etc.  I asked him to reach out to me in 2 to 3 weeks if his cough is not improved and we might then change his lisinopril to losartan  We could also try a different oral inhaler at some point as well  Plan a tentative recheck in 2 months with an annual physical    The longitudinal plan of care for the diagnosis(es)/condition(s) as documented were addressed during this visit. Due to the added complexity in care, I will continue to support Trevor in the subsequent management and with ongoing continuity of care.    Donato Murray is a 49 year old, presenting for the following health issues:  RECHECK (Cough is getting worse not better)      7/9/2025    10:47 AM   Additional Questions   Roomed by cam   Accompanied by none     History of Present Illness       Reason for visit:  Cough itching eyes and ear and chest congestion    He eats 0-1 servings of fruits and vegetables daily.He consumes 0 sweetened beverage(s) daily.He exercises with enough effort to increase his heart rate 10 to 19 minutes per day.  He exercises with enough effort to increase his heart rate 4 days per week.   He is taking medications regularly.      He comes in for  further evaluation of a persistent cough.  It has been more bothersome lately.  He is having some itchy eyes and itchy ears.  Some nasal congestion at times.  He is using Flonase nasal spray.  His cough is productive of some clear to whitish sputum.  No colored sputum.  No fever.  He had been a longtime smoker in the past, but quit in December.  He was seen by pulmonary medicine previously and had some abnormal pulmonary function tests.  He was tried on a couple of different types of inhalers, but they did not seem to do much.  He remains on baseline medications as below.  I note that he is on lisinopril for his microalbuminuria and diabetes.    He tried to get Ozempic to help with his diabetes and weight loss, but it was not covered by his insurance.  We discussed different options for that today including trying to go through an online pharmacy or company if he wants to consider paying out-of-pocket.    Patient Active Problem List   Diagnosis    Gastroesophageal reflux disease, esophagitis presence not specified    Hyperlipidemia LDL goal <100    Obesity (BMI 30.0-34.9)    Snoring    Type 2 diabetes mellitus with microalbuminuria, without long-term current use of insulin (H)    Chronic obstructive pulmonary disease, unspecified COPD type (H)     Current Outpatient Medications   Medication Sig Dispense Refill    acetaminophen (TYLENOL) 500 MG tablet Take 1,000 mg by mouth.      aspirin (ASA) 81 MG EC tablet Take 1 tablet (81 mg) by mouth daily. 90 tablet 0    atorvastatin (LIPITOR) 20 MG tablet Take 1 tablet (20 mg) by mouth daily. 90 tablet 3    esomeprazole (NEXIUM) 20 MG DR capsule Take 1 capsule (20 mg) by mouth 2 times daily. before meals 180 capsule 3    fluticasone (FLONASE) 50 MCG/ACT nasal spray Spray 1 spray into both nostrils daily. 16 g 1    lisinopril (ZESTRIL) 10 MG tablet Take 1 tablet (10 mg) by mouth daily. 90 tablet 3    budesonide-formoterol (SYMBICORT) 160-4.5 MCG/ACT Inhaler Inhale 2 puffs into  "the lungs 2 times daily. (Patient not taking: Reported on 7/9/2025) 10.2 g 11     No current facility-administered medications for this visit.           Review of Systems  Noncontributory except as above.      Objective    /81 (BP Location: Right arm, Patient Position: Chair, Cuff Size: Adult Large)   Pulse 89   Temp 98  F (36.7  C) (Temporal)   Resp 14   Ht 1.674 m (5' 5.9\")   Wt 97.5 kg (215 lb)   SpO2 100%   BMI 34.81 kg/m    Body mass index is 34.81 kg/m .  Physical Exam   GENERAL: alert and no distress  EYES: Eyes grossly normal to inspection, PERRL and conjunctivae and sclerae normal  HENT: ear canals and TM's normal, nose and mouth without ulcers or lesions  NECK: no adenopathy, no asymmetry, masses, or scars  RESP: lungs clear to auscultation - no rales, rhonchi or wheezes            Signed Electronically by: Jairo Clay MD    "

## 2025-07-21 ENCOUNTER — NURSE TRIAGE (OUTPATIENT)
Dept: FAMILY MEDICINE | Facility: CLINIC | Age: 50
End: 2025-07-21
Payer: COMMERCIAL

## 2025-07-21 NOTE — TELEPHONE ENCOUNTER
"Disposition: Patient will head to emergency room at [facility]. Patient is having moderate dyspnea at rest and with activity. Blood in mucous is new today and audible wheezing.   FARRAH Stroud  [facility]                               Reason for Disposition   MODERATE difficulty breathing (e.g., speaks in phrases, SOB even at rest, pulse 100-120) and still present when not coughing    Additional Information   Negative: SEVERE difficulty breathing (e.g., struggling for each breath, speaks in single words)   Negative: Chest pain and difficulty breathing   Negative: Bluish (or gray) lips or face now   Negative: Passed out (e.g., fainted, lost consciousness, blacked out and was not responding)   Negative: Shock suspected (e.g., cold/pale/clammy skin, too weak to stand, low BP, rapid pulse)   Negative: Difficult to awaken or acting confused (e.g., disoriented, slurred speech)   Negative: Recent chest injury (i.e., past 24 hours)   Negative: Coughed up blood and large amount (Such as: 'a half cup of blood')   Negative: Sounds like a life-threatening emergency to the triager    Answer Assessment - Initial Assessment Questions  1. ONSET: \"When did the cough begin?\"       A couple months ago   2. SEVERITY: \"How bad is the cough today?\" \"Did the blood appear after a coughing spell?\"       Cough is worsening each day, daytime is better then at night when lying down.   3. SPUTUM: \"Describe the color of your sputum\" (e.g., none, dry cough; clear, white, yellow, green)      Blood is coming with mucous, Mucous is yellow/green  4. HEMOPTYSIS: \"How much blood?\" (e.g., flecks, streaks, tablespoons)      Fresh blood, 35% blood 65% mucous    5. DIFFICULTY BREATHING: \"Are you having difficulty breathing?\" If Yes, ask: \"How bad is it?\" (e.g., mild, moderate, severe)       SOB- moderate, with and without activity   6. FEVER: \"Do you have a fever?\" If Yes, ask: \"What is your temperature, how was it measured, and when did it " "start?\"      None   7. CARDIAC HISTORY: \"Do you have any history of heart disease?\" (e.g., heart attack, congestive heart failure)       None   8. LUNG HISTORY: \"Do you have any history of lung disease?\"  (e.g., pulmonary embolus, asthma, emphysema)      Yes, Pneumonia in December 2024   9. PE RISK FACTORS: \"Do you have a history of blood clots?\" Note: Other risk factors include recent major surgery, recent prolonged travel, being bedridden.      None, no tb hx, traveled to [place] 06/2025  10. OTHER SYMPTOMS: \"Do you have any other symptoms?\" (e.g., runny nose, wheezing, chest pain)        Wheezing and running nose, no chest pain   11. PREGNANCY: \"Is there any chance you are pregnant?\" \"When was your last menstrual period?\"        None   12. TRAVEL: \"Have you traveled out of the country in the last month?\" (e.g., travel history, exposures)        Yes, [place], no known illness exposures.    Protocols used: Coughing Up Blood-A-[place]    "

## 2025-07-23 ENCOUNTER — ORDERS ONLY (AUTO-RELEASED) (OUTPATIENT)
Dept: FAMILY MEDICINE | Facility: CLINIC | Age: 50
End: 2025-07-23

## 2025-07-23 ENCOUNTER — ANCILLARY PROCEDURE (OUTPATIENT)
Dept: GENERAL RADIOLOGY | Facility: CLINIC | Age: 50
End: 2025-07-23
Attending: FAMILY MEDICINE
Payer: COMMERCIAL

## 2025-07-23 ENCOUNTER — OFFICE VISIT (OUTPATIENT)
Dept: FAMILY MEDICINE | Facility: CLINIC | Age: 50
End: 2025-07-23
Payer: COMMERCIAL

## 2025-07-23 VITALS
HEIGHT: 67 IN | RESPIRATION RATE: 18 BRPM | SYSTOLIC BLOOD PRESSURE: 114 MMHG | BODY MASS INDEX: 33.43 KG/M2 | DIASTOLIC BLOOD PRESSURE: 80 MMHG | WEIGHT: 213 LBS | OXYGEN SATURATION: 99 % | TEMPERATURE: 97.6 F | HEART RATE: 84 BPM

## 2025-07-23 DIAGNOSIS — Z12.11 SCREEN FOR COLON CANCER: ICD-10-CM

## 2025-07-23 DIAGNOSIS — E11.29 TYPE 2 DIABETES MELLITUS WITH MICROALBUMINURIA, WITHOUT LONG-TERM CURRENT USE OF INSULIN (H): ICD-10-CM

## 2025-07-23 DIAGNOSIS — R06.83 SNORING: ICD-10-CM

## 2025-07-23 DIAGNOSIS — R06.02 SOB (SHORTNESS OF BREATH): ICD-10-CM

## 2025-07-23 DIAGNOSIS — R80.9 TYPE 2 DIABETES MELLITUS WITH MICROALBUMINURIA, WITHOUT LONG-TERM CURRENT USE OF INSULIN (H): ICD-10-CM

## 2025-07-23 DIAGNOSIS — R05.3 CHRONIC COUGH: Primary | ICD-10-CM

## 2025-07-23 DIAGNOSIS — R05.3 CHRONIC COUGH: ICD-10-CM

## 2025-07-23 DIAGNOSIS — J44.9 CHRONIC OBSTRUCTIVE PULMONARY DISEASE, UNSPECIFIED COPD TYPE (H): ICD-10-CM

## 2025-07-23 DIAGNOSIS — K21.9 GASTROESOPHAGEAL REFLUX DISEASE, UNSPECIFIED WHETHER ESOPHAGITIS PRESENT: Chronic | ICD-10-CM

## 2025-07-23 PROCEDURE — G2211 COMPLEX E/M VISIT ADD ON: HCPCS | Performed by: FAMILY MEDICINE

## 2025-07-23 PROCEDURE — 99215 OFFICE O/P EST HI 40 MIN: CPT | Performed by: FAMILY MEDICINE

## 2025-07-23 PROCEDURE — 71046 X-RAY EXAM CHEST 2 VIEWS: CPT | Mod: TC | Performed by: RADIOLOGY

## 2025-07-23 RX ORDER — BUDESONIDE AND FORMOTEROL FUMARATE DIHYDRATE 160; 4.5 UG/1; UG/1
2 AEROSOL RESPIRATORY (INHALATION) 2 TIMES DAILY
Qty: 10.2 G | Refills: 11 | Status: SHIPPED | OUTPATIENT
Start: 2025-07-23

## 2025-07-23 RX ORDER — LOSARTAN POTASSIUM 25 MG/1
25 TABLET ORAL DAILY
Qty: 90 TABLET | Refills: 1 | Status: SHIPPED | OUTPATIENT
Start: 2025-07-23

## 2025-07-23 ASSESSMENT — ENCOUNTER SYMPTOMS: COUGH: 1

## 2025-07-23 ASSESSMENT — PAIN SCALES - GENERAL: PAINLEVEL_OUTOF10: NO PAIN (0)

## 2025-07-23 NOTE — PROGRESS NOTES
Assessment & Plan       ICD-10-CM    1. Chronic cough  R05.3 XR Chest 2 Views     budesonide-formoterol (SYMBICORT/BREYNA) 160-4.5 MCG/ACT inhaler      2. Chronic obstructive pulmonary disease, unspecified COPD type (H)  J44.9       3. Gastroesophageal reflux disease, unspecified whether esophagitis present  K21.9 esomeprazole (NEXIUM) 20 MG DR capsule      4. SOB (shortness of breath)  R06.02 budesonide-formoterol (SYMBICORT/BREYNA) 160-4.5 MCG/ACT inhaler      5. Type 2 diabetes mellitus with microalbuminuria, without long-term current use of insulin (H)  E11.29 losartan (COZAAR) 25 MG tablet    R80.9       6. Snoring  R06.83 Adult Sleep Eval & Management  Referral      7. Screen for colon cancer  Z12.11 COLOGUARD([employer])        Blood pressure and other vital signs look good  We discussed the above items  I suspect his cough is multifactorial secondary to underlying COPD, GERD, and perhaps his lisinopril  I advised him to start taking his Symbicort inhaler on a regular basis  We will increase his esomeprazole to 40 mg twice a day  We will discontinue lisinopril and start him on losartan for his microalbuminuria instead  We will check a chest x-ray today  I will refer him to sleep medicine for further evaluation of his snoring and possible MAGI  We discussed colon cancer screening options available order a Cologuard for him  Plan to recheck in September as scheduled for an annual physical and fasting lab work and follow-up on the above items    The longitudinal plan of care for the diagnosis(es)/condition(s) as documented were addressed during this visit. Due to the added complexity in care, I will continue to support Trevor in the subsequent management and with ongoing continuity of care.    Donato Murray is a 50 year old, presenting for the following health issues:  Cough (Productive cough X9 mo - coughed up blood through out the day X3 days)      7/23/2025     3:42 PM   Additional  Questions   Roomed by Kate JIMENEZ   Accompanied by wife         7/23/2025     3:42 PM   Patient Reported Additional Medications   Patient reports taking the following new medications none     Cough    History of Present Illness       Reason for visit:  Cough shortness of breath acidity dizzines feeling low  Symptom onset:  1-2 weeks ago  Symptoms include:  Blood coming from throat drowsyness short of breath and acidity  Symptom intensity:  Moderate  Symptom progression:  Worsening  Had these symptoms before:  No  What makes it worse:  Cough   He is taking medications regularly.      Productive cough - noticed blood in sputum.    He is accompanied by his wife today.  He complains of an ongoing cough.  It is generally nonproductive, though a few days ago he coughed up some blood in the morning.  It lasted for a day or 2, and now that has largely cleared.  He felt like the blood was coming from the back of his throat.  He has not been using his Symbicort for COPD.  He does have a history of smoking and COPD as per his chart.  He has ongoing reflux symptoms despite taking esomeprazole 20 mg twice a day.  He gets some heartburn, especially at night when he lays down.  He is still taking lisinopril.  He has diet-controlled diabetes.  He is a loud snorer.  It is unclear whether he has apnea.  He does often wake up feeling poorly rested and does feel tired during the day.  He has never been checked for MAGI.  He is on baseline medications as below.    Patient Active Problem List   Diagnosis    Gastroesophageal reflux disease, esophagitis presence not specified    Hyperlipidemia LDL goal <100    Obesity (BMI 30.0-34.9)    Snoring    Type 2 diabetes mellitus with microalbuminuria, without long-term current use of insulin (H)    Chronic obstructive pulmonary disease, unspecified COPD type (H)     Current Outpatient Medications   Medication Sig Dispense Refill    acetaminophen (TYLENOL) 500 MG tablet Take 1,000 mg by mouth.       "aspirin (ASA) 81 MG EC tablet Take 1 tablet (81 mg) by mouth daily. 90 tablet 0    atorvastatin (LIPITOR) 20 MG tablet Take 1 tablet (20 mg) by mouth daily. 90 tablet 3    cetirizine (ZYRTEC) 10 MG tablet Take 1 tablet (10 mg) by mouth daily. 30 tablet 11    esomeprazole (NEXIUM) 20 MG DR capsule Take 1 capsule (20 mg) by mouth 2 times daily. before meals 180 capsule 3    fluticasone (FLONASE) 50 MCG/ACT nasal spray Spray 1 spray into both nostrils daily. 16 g 1    lisinopril (ZESTRIL) 10 MG tablet Take 1 tablet (10 mg) by mouth daily. 90 tablet 3    budesonide-formoterol (SYMBICORT) 160-4.5 MCG/ACT Inhaler Inhale 2 puffs into the lungs 2 times daily. (Patient not taking: Reported on 7/9/2025) 10.2 g 11     No current facility-administered medications for this visit.           Review of Systems  Mainly significant for the above.      Objective    /80 (BP Location: Right arm, Patient Position: Sitting, Cuff Size: Adult Large)   Pulse 84   Temp 97.6  F (36.4  C) (Temporal)   Resp 18   Ht 1.69 m (5' 6.54\")   Wt 96.6 kg (213 lb)   SpO2 99%   BMI 33.83 kg/m    Body mass index is 33.83 kg/m .  Physical Exam   GENERAL: alert and no distress  HENT: normal cephalic/atraumatic, nose and mouth without ulcers or lesions, oropharynx clear, and oral mucous membranes moist  NECK: no adenopathy, no asymmetry, masses, or scars  RESP: lungs clear to auscultation - no rales, rhonchi or wheezes            Signed Electronically by: Jairo Clay MD    "

## 2025-07-24 ENCOUNTER — PATIENT OUTREACH (OUTPATIENT)
Dept: CARE COORDINATION | Facility: CLINIC | Age: 50
End: 2025-07-24
Payer: COMMERCIAL

## 2025-07-28 ENCOUNTER — PATIENT OUTREACH (OUTPATIENT)
Dept: CARE COORDINATION | Facility: CLINIC | Age: 50
End: 2025-07-28
Payer: COMMERCIAL

## 2025-08-07 PROBLEM — E78.5 HYPERLIPIDEMIA LDL GOAL <100: Chronic | Status: RESOLVED | Noted: 2018-03-21 | Resolved: 2025-08-07

## 2025-08-09 ENCOUNTER — HEALTH MAINTENANCE LETTER (OUTPATIENT)
Age: 50
End: 2025-08-09

## 2025-08-12 ENCOUNTER — TELEPHONE (OUTPATIENT)
Dept: FAMILY MEDICINE | Facility: CLINIC | Age: 50
End: 2025-08-12
Payer: COMMERCIAL

## 2025-08-26 ENCOUNTER — MYC REFILL (OUTPATIENT)
Dept: FAMILY MEDICINE | Facility: CLINIC | Age: 50
End: 2025-08-26
Payer: COMMERCIAL

## 2025-08-26 DIAGNOSIS — E78.5 HYPERLIPIDEMIA LDL GOAL <100: ICD-10-CM

## 2025-08-27 RX ORDER — ATORVASTATIN CALCIUM 20 MG/1
20 TABLET, FILM COATED ORAL DAILY
Qty: 90 TABLET | Refills: 2 | Status: SHIPPED | OUTPATIENT
Start: 2025-08-27

## 2025-08-31 LAB — NONINV COLON CA DNA+OCC BLD SCRN STL QL: NEGATIVE

## (undated) DEVICE — SOL WATER IRRIG 1000ML BOTTLE 07139-09

## (undated) DEVICE — PREP CHLORAPREP 26ML TINTED ORANGE  260815

## (undated) RX ORDER — ALBUTEROL SULFATE 0.83 MG/ML
SOLUTION RESPIRATORY (INHALATION)
Status: DISPENSED
Start: 2024-12-03

## (undated) RX ORDER — FENTANYL CITRATE 50 UG/ML
INJECTION, SOLUTION INTRAMUSCULAR; INTRAVENOUS
Status: DISPENSED
Start: 2021-03-15